# Patient Record
Sex: FEMALE | Race: WHITE | NOT HISPANIC OR LATINO | Employment: FULL TIME | URBAN - METROPOLITAN AREA
[De-identification: names, ages, dates, MRNs, and addresses within clinical notes are randomized per-mention and may not be internally consistent; named-entity substitution may affect disease eponyms.]

---

## 2023-02-22 ENCOUNTER — ANESTHESIA EVENT (OUTPATIENT)
Dept: SURGERY | Facility: HOSPITAL | Age: 63
DRG: 331 | End: 2023-02-22
Payer: COMMERCIAL

## 2023-02-22 RX ORDER — FERROUS SULFATE 325(65) MG
325 TABLET ORAL 2 TIMES DAILY
COMMUNITY

## 2023-02-22 RX ORDER — IBUPROFEN 200 MG
400 TABLET ORAL EVERY 6 HOURS PRN
COMMUNITY

## 2023-02-22 RX ORDER — ASPIRIN 81 MG
200 TABLET, DELAYED RELEASE (ENTERIC COATED) ORAL AT BEDTIME
COMMUNITY

## 2023-02-22 RX ORDER — DOCUSATE SODIUM 100 MG/1
200 CAPSULE, LIQUID FILLED ORAL
COMMUNITY
End: 2023-02-22

## 2023-02-23 ENCOUNTER — ANESTHESIA (OUTPATIENT)
Dept: SURGERY | Facility: HOSPITAL | Age: 63
DRG: 331 | End: 2023-02-23
Payer: COMMERCIAL

## 2023-02-23 ENCOUNTER — HOSPITAL ENCOUNTER (INPATIENT)
Facility: HOSPITAL | Age: 63
LOS: 3 days | Discharge: HOME | DRG: 331 | End: 2023-02-26
Attending: COLON & RECTAL SURGERY | Admitting: COLON & RECTAL SURGERY
Payer: COMMERCIAL

## 2023-02-23 DIAGNOSIS — C18.9 MALIGNANT NEOPLASM OF COLON, UNSPECIFIED PART OF COLON (H): Primary | ICD-10-CM

## 2023-02-23 LAB
ABO/RH(D): NORMAL
ANTIBODY SCREEN: NEGATIVE
GLUCOSE BLDC GLUCOMTR-MCNC: 120 MG/DL (ref 70–99)
HGB BLD-MCNC: 8.4 G/DL (ref 11.7–15.7)
SPECIMEN EXPIRATION DATE: NORMAL

## 2023-02-23 PROCEDURE — 88342 IMHCHEM/IMCYTCHM 1ST ANTB: CPT | Mod: 26 | Performed by: PATHOLOGY

## 2023-02-23 PROCEDURE — 999N000141 HC STATISTIC PRE-PROCEDURE NURSING ASSESSMENT: Performed by: COLON & RECTAL SURGERY

## 2023-02-23 PROCEDURE — 258N000003 HC RX IP 258 OP 636: Performed by: ANESTHESIOLOGY

## 2023-02-23 PROCEDURE — 258N000003 HC RX IP 258 OP 636: Performed by: STUDENT IN AN ORGANIZED HEALTH CARE EDUCATION/TRAINING PROGRAM

## 2023-02-23 PROCEDURE — 250N000009 HC RX 250: Performed by: NURSE ANESTHETIST, CERTIFIED REGISTERED

## 2023-02-23 PROCEDURE — 272N000001 HC OR GENERAL SUPPLY STERILE: Performed by: COLON & RECTAL SURGERY

## 2023-02-23 PROCEDURE — 360N000077 HC SURGERY LEVEL 4, PER MIN: Performed by: COLON & RECTAL SURGERY

## 2023-02-23 PROCEDURE — 250N000025 HC SEVOFLURANE, PER MIN: Performed by: COLON & RECTAL SURGERY

## 2023-02-23 PROCEDURE — 250N000011 HC RX IP 250 OP 636

## 2023-02-23 PROCEDURE — 250N000009 HC RX 250

## 2023-02-23 PROCEDURE — 370N000017 HC ANESTHESIA TECHNICAL FEE, PER MIN: Performed by: COLON & RECTAL SURGERY

## 2023-02-23 PROCEDURE — 250N000011 HC RX IP 250 OP 636: Performed by: STUDENT IN AN ORGANIZED HEALTH CARE EDUCATION/TRAINING PROGRAM

## 2023-02-23 PROCEDURE — 710N000010 HC RECOVERY PHASE 1, LEVEL 2, PER MIN: Performed by: COLON & RECTAL SURGERY

## 2023-02-23 PROCEDURE — C9290 INJ, BUPIVACAINE LIPOSOME: HCPCS | Performed by: ANESTHESIOLOGY

## 2023-02-23 PROCEDURE — 250N000009 HC RX 250: Performed by: COLON & RECTAL SURGERY

## 2023-02-23 PROCEDURE — 86901 BLOOD TYPING SEROLOGIC RH(D): CPT | Performed by: STUDENT IN AN ORGANIZED HEALTH CARE EDUCATION/TRAINING PROGRAM

## 2023-02-23 PROCEDURE — 258N000003 HC RX IP 258 OP 636: Performed by: NURSE ANESTHETIST, CERTIFIED REGISTERED

## 2023-02-23 PROCEDURE — 85018 HEMOGLOBIN: CPT | Performed by: STUDENT IN AN ORGANIZED HEALTH CARE EDUCATION/TRAINING PROGRAM

## 2023-02-23 PROCEDURE — 250N000013 HC RX MED GY IP 250 OP 250 PS 637: Performed by: STUDENT IN AN ORGANIZED HEALTH CARE EDUCATION/TRAINING PROGRAM

## 2023-02-23 PROCEDURE — 88309 TISSUE EXAM BY PATHOLOGIST: CPT | Mod: 26 | Performed by: PATHOLOGY

## 2023-02-23 PROCEDURE — 88342 IMHCHEM/IMCYTCHM 1ST ANTB: CPT | Mod: TC | Performed by: COLON & RECTAL SURGERY

## 2023-02-23 PROCEDURE — 88341 IMHCHEM/IMCYTCHM EA ADD ANTB: CPT | Mod: 26 | Performed by: PATHOLOGY

## 2023-02-23 PROCEDURE — 250N000011 HC RX IP 250 OP 636: Performed by: NURSE ANESTHETIST, CERTIFIED REGISTERED

## 2023-02-23 PROCEDURE — 250N000011 HC RX IP 250 OP 636: Performed by: ANESTHESIOLOGY

## 2023-02-23 PROCEDURE — 250N000009 HC RX 250: Performed by: ANESTHESIOLOGY

## 2023-02-23 PROCEDURE — 36415 COLL VENOUS BLD VENIPUNCTURE: CPT | Performed by: STUDENT IN AN ORGANIZED HEALTH CARE EDUCATION/TRAINING PROGRAM

## 2023-02-23 PROCEDURE — 0DTF4ZZ RESECTION OF RIGHT LARGE INTESTINE, PERCUTANEOUS ENDOSCOPIC APPROACH: ICD-10-PCS | Performed by: COLON & RECTAL SURGERY

## 2023-02-23 PROCEDURE — 120N000001 HC R&B MED SURG/OB

## 2023-02-23 RX ORDER — ENOXAPARIN SODIUM 100 MG/ML
40 INJECTION SUBCUTANEOUS EVERY 24 HOURS
Status: DISCONTINUED | OUTPATIENT
Start: 2023-02-24 | End: 2023-02-26 | Stop reason: HOSPADM

## 2023-02-23 RX ORDER — SODIUM CHLORIDE, SODIUM LACTATE, POTASSIUM CHLORIDE, CALCIUM CHLORIDE 600; 310; 30; 20 MG/100ML; MG/100ML; MG/100ML; MG/100ML
INJECTION, SOLUTION INTRAVENOUS CONTINUOUS
Status: DISCONTINUED | OUTPATIENT
Start: 2023-02-23 | End: 2023-02-23 | Stop reason: HOSPADM

## 2023-02-23 RX ORDER — ACETAMINOPHEN 325 MG/1
975 TABLET ORAL ONCE
Status: COMPLETED | OUTPATIENT
Start: 2023-02-23 | End: 2023-02-23

## 2023-02-23 RX ORDER — BUPIVACAINE HYDROCHLORIDE 2.5 MG/ML
INJECTION, SOLUTION EPIDURAL; INFILTRATION; INTRACAUDAL
Status: COMPLETED | OUTPATIENT
Start: 2023-02-23 | End: 2023-02-23

## 2023-02-23 RX ORDER — FENTANYL CITRATE 50 UG/ML
50 INJECTION, SOLUTION INTRAMUSCULAR; INTRAVENOUS EVERY 5 MIN PRN
Status: DISCONTINUED | OUTPATIENT
Start: 2023-02-23 | End: 2023-02-23 | Stop reason: HOSPADM

## 2023-02-23 RX ORDER — KETAMINE HYDROCHLORIDE 10 MG/ML
INJECTION INTRAMUSCULAR; INTRAVENOUS PRN
Status: DISCONTINUED | OUTPATIENT
Start: 2023-02-23 | End: 2023-02-23

## 2023-02-23 RX ORDER — OXYCODONE HYDROCHLORIDE 5 MG/1
10 TABLET ORAL
Status: CANCELLED | OUTPATIENT
Start: 2023-02-23

## 2023-02-23 RX ORDER — MAGNESIUM HYDROXIDE 1200 MG/15ML
LIQUID ORAL PRN
Status: DISCONTINUED | OUTPATIENT
Start: 2023-02-23 | End: 2023-02-23 | Stop reason: HOSPADM

## 2023-02-23 RX ORDER — FENTANYL CITRATE 50 UG/ML
25 INJECTION, SOLUTION INTRAMUSCULAR; INTRAVENOUS EVERY 5 MIN PRN
Status: DISCONTINUED | OUTPATIENT
Start: 2023-02-23 | End: 2023-02-23 | Stop reason: HOSPADM

## 2023-02-23 RX ORDER — ONDANSETRON 2 MG/ML
INJECTION INTRAMUSCULAR; INTRAVENOUS PRN
Status: DISCONTINUED | OUTPATIENT
Start: 2023-02-23 | End: 2023-02-23

## 2023-02-23 RX ORDER — FENTANYL CITRATE 50 UG/ML
25-100 INJECTION, SOLUTION INTRAMUSCULAR; INTRAVENOUS
Status: DISCONTINUED | OUTPATIENT
Start: 2023-02-23 | End: 2023-02-23 | Stop reason: HOSPADM

## 2023-02-23 RX ORDER — HEPARIN SODIUM 5000 [USP'U]/.5ML
5000 INJECTION, SOLUTION INTRAVENOUS; SUBCUTANEOUS
Status: COMPLETED | OUTPATIENT
Start: 2023-02-23 | End: 2023-02-23

## 2023-02-23 RX ORDER — PROPOFOL 10 MG/ML
INJECTION, EMULSION INTRAVENOUS PRN
Status: DISCONTINUED | OUTPATIENT
Start: 2023-02-23 | End: 2023-02-23

## 2023-02-23 RX ORDER — NALOXONE HYDROCHLORIDE 1 MG/ML
0.4 INJECTION INTRAMUSCULAR; INTRAVENOUS; SUBCUTANEOUS
Status: DISCONTINUED | OUTPATIENT
Start: 2023-02-23 | End: 2023-02-26 | Stop reason: HOSPADM

## 2023-02-23 RX ORDER — ONDANSETRON 2 MG/ML
4 INJECTION INTRAMUSCULAR; INTRAVENOUS EVERY 6 HOURS PRN
Status: DISCONTINUED | OUTPATIENT
Start: 2023-02-23 | End: 2023-02-26 | Stop reason: HOSPADM

## 2023-02-23 RX ORDER — PROPOFOL 10 MG/ML
INJECTION, EMULSION INTRAVENOUS CONTINUOUS PRN
Status: DISCONTINUED | OUTPATIENT
Start: 2023-02-23 | End: 2023-02-23

## 2023-02-23 RX ORDER — SCOLOPAMINE TRANSDERMAL SYSTEM 1 MG/1
1 PATCH, EXTENDED RELEASE TRANSDERMAL ONCE
Status: COMPLETED | OUTPATIENT
Start: 2023-02-23 | End: 2023-02-24

## 2023-02-23 RX ORDER — ONDANSETRON 4 MG/1
4 TABLET, ORALLY DISINTEGRATING ORAL EVERY 6 HOURS PRN
Status: DISCONTINUED | OUTPATIENT
Start: 2023-02-23 | End: 2023-02-26 | Stop reason: HOSPADM

## 2023-02-23 RX ORDER — ONDANSETRON 2 MG/ML
4 INJECTION INTRAMUSCULAR; INTRAVENOUS ONCE
Status: COMPLETED | OUTPATIENT
Start: 2023-02-23 | End: 2023-02-23

## 2023-02-23 RX ORDER — LIDOCAINE HYDROCHLORIDE 10 MG/ML
INJECTION, SOLUTION INFILTRATION; PERINEURAL PRN
Status: DISCONTINUED | OUTPATIENT
Start: 2023-02-23 | End: 2023-02-23

## 2023-02-23 RX ORDER — MAGNESIUM SULFATE 4 G/50ML
4 INJECTION INTRAVENOUS ONCE
Status: COMPLETED | OUTPATIENT
Start: 2023-02-23 | End: 2023-02-23

## 2023-02-23 RX ORDER — NALOXONE HYDROCHLORIDE 1 MG/ML
0.2 INJECTION INTRAMUSCULAR; INTRAVENOUS; SUBCUTANEOUS
Status: DISCONTINUED | OUTPATIENT
Start: 2023-02-23 | End: 2023-02-26 | Stop reason: HOSPADM

## 2023-02-23 RX ORDER — ACETAMINOPHEN 325 MG/1
975 TABLET ORAL EVERY 8 HOURS
Status: COMPLETED | OUTPATIENT
Start: 2023-02-23 | End: 2023-02-26

## 2023-02-23 RX ORDER — LIDOCAINE 40 MG/G
CREAM TOPICAL
Status: DISCONTINUED | OUTPATIENT
Start: 2023-02-23 | End: 2023-02-26 | Stop reason: HOSPADM

## 2023-02-23 RX ORDER — CEFAZOLIN SODIUM/WATER 2 G/20 ML
2 SYRINGE (ML) INTRAVENOUS
Status: COMPLETED | OUTPATIENT
Start: 2023-02-23 | End: 2023-02-23

## 2023-02-23 RX ORDER — ACETAMINOPHEN 325 MG/1
650 TABLET ORAL EVERY 4 HOURS PRN
Status: DISCONTINUED | OUTPATIENT
Start: 2023-02-26 | End: 2023-02-26 | Stop reason: HOSPADM

## 2023-02-23 RX ORDER — SODIUM CHLORIDE, SODIUM LACTATE, POTASSIUM CHLORIDE, CALCIUM CHLORIDE 600; 310; 30; 20 MG/100ML; MG/100ML; MG/100ML; MG/100ML
INJECTION, SOLUTION INTRAVENOUS CONTINUOUS
Status: DISCONTINUED | OUTPATIENT
Start: 2023-02-23 | End: 2023-02-24

## 2023-02-23 RX ORDER — CEFAZOLIN SODIUM/WATER 2 G/20 ML
2 SYRINGE (ML) INTRAVENOUS SEE ADMIN INSTRUCTIONS
Status: DISCONTINUED | OUTPATIENT
Start: 2023-02-23 | End: 2023-02-23 | Stop reason: HOSPADM

## 2023-02-23 RX ORDER — FENTANYL CITRATE 50 UG/ML
INJECTION, SOLUTION INTRAMUSCULAR; INTRAVENOUS PRN
Status: DISCONTINUED | OUTPATIENT
Start: 2023-02-23 | End: 2023-02-23

## 2023-02-23 RX ORDER — ONDANSETRON 4 MG/1
4 TABLET, ORALLY DISINTEGRATING ORAL EVERY 30 MIN PRN
Status: DISCONTINUED | OUTPATIENT
Start: 2023-02-23 | End: 2023-02-23 | Stop reason: HOSPADM

## 2023-02-23 RX ORDER — METRONIDAZOLE 500 MG/100ML
500 INJECTION, SOLUTION INTRAVENOUS
Status: COMPLETED | OUTPATIENT
Start: 2023-02-23 | End: 2023-02-23

## 2023-02-23 RX ORDER — GABAPENTIN 100 MG/1
100 CAPSULE ORAL 3 TIMES DAILY
Status: DISCONTINUED | OUTPATIENT
Start: 2023-02-23 | End: 2023-02-26 | Stop reason: HOSPADM

## 2023-02-23 RX ORDER — LIDOCAINE 40 MG/G
CREAM TOPICAL
Status: DISCONTINUED | OUTPATIENT
Start: 2023-02-23 | End: 2023-02-23 | Stop reason: HOSPADM

## 2023-02-23 RX ORDER — HYDROMORPHONE HYDROCHLORIDE 1 MG/ML
0.2 INJECTION, SOLUTION INTRAMUSCULAR; INTRAVENOUS; SUBCUTANEOUS
Status: DISCONTINUED | OUTPATIENT
Start: 2023-02-23 | End: 2023-02-26 | Stop reason: HOSPADM

## 2023-02-23 RX ORDER — HYDROMORPHONE HYDROCHLORIDE 1 MG/ML
0.4 INJECTION, SOLUTION INTRAMUSCULAR; INTRAVENOUS; SUBCUTANEOUS
Status: DISCONTINUED | OUTPATIENT
Start: 2023-02-23 | End: 2023-02-26 | Stop reason: HOSPADM

## 2023-02-23 RX ORDER — ONDANSETRON 2 MG/ML
4 INJECTION INTRAMUSCULAR; INTRAVENOUS EVERY 30 MIN PRN
Status: DISCONTINUED | OUTPATIENT
Start: 2023-02-23 | End: 2023-02-23 | Stop reason: HOSPADM

## 2023-02-23 RX ORDER — OXYCODONE HYDROCHLORIDE 5 MG/1
5 TABLET ORAL
Status: CANCELLED | OUTPATIENT
Start: 2023-02-23

## 2023-02-23 RX ADMIN — FENTANYL CITRATE 50 MCG: 50 INJECTION, SOLUTION INTRAMUSCULAR; INTRAVENOUS at 15:24

## 2023-02-23 RX ADMIN — PROPOFOL 200 MG: 10 INJECTION, EMULSION INTRAVENOUS at 13:35

## 2023-02-23 RX ADMIN — Medication 2 G: at 13:39

## 2023-02-23 RX ADMIN — FENTANYL CITRATE 50 MCG: 50 INJECTION, SOLUTION INTRAMUSCULAR; INTRAVENOUS at 14:13

## 2023-02-23 RX ADMIN — KETAMINE HYDROCHLORIDE 50 MG: 10 INJECTION, SOLUTION INTRAMUSCULAR; INTRAVENOUS at 13:35

## 2023-02-23 RX ADMIN — BUPIVACAINE HYDROCHLORIDE 20 ML: 2.5 INJECTION, SOLUTION EPIDURAL; INFILTRATION; INTRACAUDAL at 13:49

## 2023-02-23 RX ADMIN — ROCURONIUM BROMIDE 20 MG: 50 INJECTION, SOLUTION INTRAVENOUS at 15:01

## 2023-02-23 RX ADMIN — ONDANSETRON 4 MG: 2 INJECTION INTRAMUSCULAR; INTRAVENOUS at 12:51

## 2023-02-23 RX ADMIN — BUPIVACAINE HYDROCHLORIDE 20 ML: 2.5 INJECTION, SOLUTION EPIDURAL; INFILTRATION; INTRACAUDAL at 01:45

## 2023-02-23 RX ADMIN — LIDOCAINE HYDROCHLORIDE 30 MG: 10 INJECTION, SOLUTION INFILTRATION; PERINEURAL at 13:35

## 2023-02-23 RX ADMIN — FENTANYL CITRATE 50 MCG: 50 INJECTION, SOLUTION INTRAMUSCULAR; INTRAVENOUS at 17:06

## 2023-02-23 RX ADMIN — SODIUM CHLORIDE, POTASSIUM CHLORIDE, SODIUM LACTATE AND CALCIUM CHLORIDE: 600; 310; 30; 20 INJECTION, SOLUTION INTRAVENOUS at 19:17

## 2023-02-23 RX ADMIN — HEPARIN SODIUM 5000 UNITS: 10000 INJECTION, SOLUTION INTRAVENOUS; SUBCUTANEOUS at 12:47

## 2023-02-23 RX ADMIN — SCOPALAMINE 1 PATCH: 1 PATCH, EXTENDED RELEASE TRANSDERMAL at 13:16

## 2023-02-23 RX ADMIN — ONDANSETRON 4 MG: 2 INJECTION INTRAMUSCULAR; INTRAVENOUS at 16:09

## 2023-02-23 RX ADMIN — HYDROMORPHONE HYDROCHLORIDE 0.4 MG: 1 INJECTION, SOLUTION INTRAMUSCULAR; INTRAVENOUS; SUBCUTANEOUS at 17:47

## 2023-02-23 RX ADMIN — ONDANSETRON 4 MG: 2 INJECTION INTRAMUSCULAR; INTRAVENOUS at 17:43

## 2023-02-23 RX ADMIN — MIDAZOLAM 2 MG: 1 INJECTION INTRAMUSCULAR; INTRAVENOUS at 13:23

## 2023-02-23 RX ADMIN — MAGNESIUM SULFATE HEPTAHYDRATE 4 G: 80 INJECTION, SOLUTION INTRAVENOUS at 13:04

## 2023-02-23 RX ADMIN — ACETAMINOPHEN 975 MG: 325 TABLET ORAL at 19:10

## 2023-02-23 RX ADMIN — ACETAMINOPHEN 975 MG: 325 TABLET ORAL at 12:39

## 2023-02-23 RX ADMIN — BUPIVACAINE 10 ML: 13.3 INJECTION, SUSPENSION, LIPOSOMAL INFILTRATION at 13:49

## 2023-02-23 RX ADMIN — SODIUM CHLORIDE, POTASSIUM CHLORIDE, SODIUM LACTATE AND CALCIUM CHLORIDE: 600; 310; 30; 20 INJECTION, SOLUTION INTRAVENOUS at 12:46

## 2023-02-23 RX ADMIN — HYDROMORPHONE HYDROCHLORIDE 0.2 MG: 1 INJECTION, SOLUTION INTRAMUSCULAR; INTRAVENOUS; SUBCUTANEOUS at 20:23

## 2023-02-23 RX ADMIN — SUGAMMADEX 200 MG: 100 INJECTION, SOLUTION INTRAVENOUS at 16:33

## 2023-02-23 RX ADMIN — FENTANYL CITRATE 50 MCG: 50 INJECTION, SOLUTION INTRAMUSCULAR; INTRAVENOUS at 14:11

## 2023-02-23 RX ADMIN — ROCURONIUM BROMIDE 50 MG: 50 INJECTION, SOLUTION INTRAVENOUS at 13:36

## 2023-02-23 RX ADMIN — FENTANYL CITRATE 50 MCG: 50 INJECTION, SOLUTION INTRAMUSCULAR; INTRAVENOUS at 16:12

## 2023-02-23 RX ADMIN — PHENYLEPHRINE HYDROCHLORIDE 100 MCG: 10 INJECTION INTRAVENOUS at 14:56

## 2023-02-23 RX ADMIN — SODIUM CHLORIDE, POTASSIUM CHLORIDE, SODIUM LACTATE AND CALCIUM CHLORIDE: 600; 310; 30; 20 INJECTION, SOLUTION INTRAVENOUS at 14:42

## 2023-02-23 RX ADMIN — PROPOFOL 30 MCG/KG/MIN: 10 INJECTION, EMULSION INTRAVENOUS at 13:45

## 2023-02-23 RX ADMIN — METRONIDAZOLE 500 MG: 500 INJECTION, SOLUTION INTRAVENOUS at 12:56

## 2023-02-23 RX ADMIN — FENTANYL CITRATE 50 MCG: 50 INJECTION, SOLUTION INTRAMUSCULAR; INTRAVENOUS at 17:15

## 2023-02-23 RX ADMIN — GABAPENTIN 100 MG: 100 CAPSULE ORAL at 20:25

## 2023-02-23 ASSESSMENT — ACTIVITIES OF DAILY LIVING (ADL)
ADLS_ACUITY_SCORE: 20

## 2023-02-23 NOTE — PHARMACY-ADMISSION MEDICATION HISTORY
Pharmacy Note - Admission Medication History    Pertinent Provider Information: Patient completed antibiotic prep with neomycin & metronidazole yesterday as instructed.   ______________________________________________________________________    Prior To Admission (PTA) med list completed and updated in EMR.       PTA Med List   Medication Sig Last Dose     acetaminophen-codeine (TYLENOL #3) 300-30 MG tablet Take 1 tablet by mouth every 6 hours as needed for severe pain (7-10) Past Month     docusate sodium (COLACE) 100 MG tablet Take 200 mg by mouth At Bedtime Past Week     ferrous sulfate (FEROSUL) 325 (65 Fe) MG tablet Take 325 mg by mouth 2 times daily On hold Past Week     Glucosamine HCl 1000 MG TABS Take 1,000 mg by mouth daily Past Week     ibuprofen (ADVIL/MOTRIN) 200 MG tablet Take 400 mg by mouth every 6 hours as needed for pain 2/22/2023     MELATONIN PO Take 10 mg by mouth At Bedtime 2/21/2023     Multiple Vitamin (THERAGRAN OR) Take 1 tablet by mouth daily Past Week     VITAMIN D, CHOLECALCIFEROL, PO Take 2,000 Units by mouth daily Past Week       Information source(s): Patient, Clinic records and [facility]    Method of interview communication: in-person    Patient was asked about OTC/herbal products specifically.  PTA med list reflects this.    Based on the pharmacist's assessment, the PTA med list information appears reliable    Medication Affordability:  Not including over the counter (OTC) medications, was there a time in the past 12 months when you did not take your medications as prescribed because of cost?: No    Allergies were reviewed, assessed, and updated with the patient.      Patient does not use any multi-dose medications prior to admission.     Thank you for the opportunity to participate in the care of this patient.      Karson Padron [facility]     2/23/2023     12:24 PM

## 2023-02-23 NOTE — ANESTHESIA PREPROCEDURE EVALUATION
Anesthesia Pre-Procedure Evaluation    Patient: Chelle Cowan   MRN: [id number] : 1960        Procedure : Procedure(s):  LAPAROSCOPIC RIGHT COLECTOMY          Past Medical History:   Diagnosis Date     Anemia      Arthritis      Colon cancer ([facility])      History of blood transfusion      Obese      Thrombosis of leg     with third pregnancy     Varicella       Past Surgical History:   Procedure Laterality Date     ARTHROPLASTY HIP ANTERIOR  10/08/2013    Procedure: ARTHROPLASTY HIP ANTERIOR;  LEFT DIRECT ANTERIOR TOTAL HIP ARTHROPLASTY ;  Surgeon: Navneet Jefferson MD;  Location: [facility] OR     BREAST SURGERY  2011    right breast cyst     BREAST SURGERY      left breast bx     ENT SURGERY      wisdom teeth extraction     WISDOM TOOTH EXTRACTION        No Known Allergies   Social History     Tobacco Use     Smoking status: Never     Smokeless tobacco: Never   Substance Use Topics     Alcohol use: Not Currently      Wt Readings from Last 1 Encounters:   23 74.1 kg (163 lb 6.4 oz)        Anesthesia Evaluation   Pt has had prior anesthetic.     No history of anesthetic complications       ROS/MED HX  ENT/Pulmonary:       Neurologic:       Cardiovascular:       METS/Exercise Tolerance:     Hematologic:     (+) History of blood clots,     Musculoskeletal:       GI/Hepatic:       Renal/Genitourinary:       Endo:       Psychiatric/Substance Use:       Infectious Disease:       Malignancy:       Other:            Physical Exam    Airway        Mallampati: II    Neck ROM: full     Respiratory Devices and Support         Dental       (+) Minor Abnormalities - some fillings, tiny chips      Cardiovascular   cardiovascular exam normal          Pulmonary   pulmonary exam normal                OUTSIDE LABS:  CBC:   Lab Results   Component Value Date    HGB 8.4 (L) 2023    HGB 9.9 (L) 10/10/2013     10/11/2013     10/08/2013     BMP:   Lab Results   Component Value Date    CR 0.45 (L)  10/08/2013     (H) 02/23/2023    GLC 98 10/10/2013     COAGS: No results found for: PTT, INR, FIBR  POC: No results found for: BGM, HCG, HCGS  HEPATIC: No results found for: ALBUMIN, PROTTOTAL, ALT, AST, GGT, ALKPHOS, BILITOTAL, BILIDIRECT, MINERVA  OTHER: No results found for: PH, LACT, A1C, NNAMDI, PHOS, MAG, LIPASE, AMYLASE, TSH, T4, T3, CRP, SED    Anesthesia Plan    ASA Status:  2      Anesthesia Type: General.     - Airway: ETT              Consents    Anesthesia Plan(s) and associated risks, benefits, and realistic alternatives discussed. Questions answered and patient/representative(s) expressed understanding.     - Discussed: Risks, Benefits and Alternatives for the PROCEDURE were discussed     - Discussed with:  Patient      - Extended Intubation/Ventilatory Support Discussed: No.      - Patient is DNR/DNI Status: No    Use of blood products discussed: No .     Postoperative Care    Pain management: Multi-modal analgesia.   PONV prophylaxis: Ondansetron (or other 5HT-3), Dexamethasone or Solumedrol     Comments:                Naa Gaspar MD

## 2023-02-23 NOTE — ANESTHESIA CARE TRANSFER NOTE
Patient: Chelle Cowan    Procedure: Procedure(s):  LAPAROSCOPIC RIGHT COLECTOMY       Diagnosis: Screen for colon cancer [Z12.11]  Diagnosis Additional Information: No value filed.    Anesthesia Type:   General     Note:    Oropharynx: oropharynx clear of all foreign objects  Level of Consciousness: drowsy  Oxygen Supplementation: face mask  Level of Supplemental Oxygen (L/min / FiO2): 6  Independent Airway: airway patency satisfactory and stable  Dentition: dentition unchanged  Vital Signs Stable: post-procedure vital signs reviewed and stable  Report to RN Given: handoff report given  Patient transferred to: PACU  Comments: Patient transferred to PACU by CRNA. Report given to PACU RN, all questions answered. Patient hemodynamically stable. CRNA ensured PACU RN was comfortable taking over care.  Handoff Report: Identifed the Patient, Identified the Reponsible Provider, Reviewed the pertinent medical history, Discussed the surgical course, Reviewed Intra-OP anesthesia mangement and issues during anesthesia, Set expectations for post-procedure period and Allowed opportunity for questions and acknowledgement of understanding      Vitals:  Vitals Value Taken Time   /78 02/23/23 1649   Temp 36.3  C (97.4  F) 02/23/23 1649   Pulse 95 02/23/23 1649   Resp 12 02/23/23 1649   SpO2 100 % 02/23/23 1649       Electronically Signed By: ASHLEY Palmer CRNA  February 23, 2023  4:51 PM

## 2023-02-23 NOTE — ANESTHESIA PROCEDURE NOTES
"TAP Procedure Note    Pre-Procedure   Staff -        Anesthesiologist:  Naa Gaspar MD       Performed By: anesthesiologist       Location: OR       Procedure Start/Stop Times: 2/23/2023 1:49 PM and 2/23/2023 1:52 PM       Pre-Anesthestic Checklist: patient identified, IV checked, site marked, risks and benefits discussed, informed consent, monitors and equipment checked, pre-op evaluation, at physician/surgeon's request and post-op pain management  Timeout:       Correct Patient: Yes        Correct Procedure: Yes        Correct Site: Yes        Correct Position: Yes        Correct Laterality: Yes        Site Marked: Yes  Procedure Documentation  Procedure: TAP       Laterality: right       Patient Position: supine       Skin prep: Chloraprep       Ultrasound guided       1. Ultrasound was used to identify targeted nerve, plexus, vascular marker, or fascial plane and place a needle adjacent to it in real-time.       2. Ultrasound was used to visualize the spread of anesthetic in close proximity to the above referenced structure.       3. A permanent image is entered into the patient's record.    Assessment/Narrative         The placement was negative for: blood aspirated, painful injection and site bleeding       Paresthesias: No.       Bolus given via needle..        Secured via.        Insertion/Infusion Method: Single Shot       Complications: none    Medication(s) Administered   Bupivacaine 0.25% PF (Infiltration) - Infiltration   20 mL - 2/23/2023 1:49:00 PM  Bupivacaine liposome (Exparel) 1.3% LA inj susp (Infiltration) - Infiltration   10 mL - 2/23/2023 1:49:00 PM  Medication Administration Time: 2/23/2023 1:49 PM      FOR [facility] [place] ONLY:   Pain Team Contact information: please page the Pain Team Via [employer]. Search \"Pain\". During daytime hours, please page the attending first. At night please page the resident first.    "

## 2023-02-23 NOTE — ANESTHESIA POSTPROCEDURE EVALUATION
Patient: Chelle Cowan    Procedure: Procedure(s):  LAPAROSCOPIC RIGHT COLECTOMY       Anesthesia Type:  General    Note:  Disposition: Inpatient   Postop Pain Control: Uneventful            Sign Out: Well controlled pain   PONV: Yes            Symptoms: Nausea only            Sign Out: mild nausea.   Neuro/Psych: Uneventful            Sign Out: Acceptable/Baseline neuro status   Airway/Respiratory: Uneventful            Sign Out: Acceptable/Baseline resp. status   CV/Hemodynamics: Uneventful            Sign Out: Acceptable CV status; No obvious hypovolemia; No obvious fluid overload   Other NRE: NONE   DID A NON-ROUTINE EVENT OCCUR? No           Last vitals:  Vitals Value Taken Time   /72 02/23/23 1745   Temp 36.3  C (97.4  F) 02/23/23 1649   Pulse 84 02/23/23 1746   Resp 15 02/23/23 1746   SpO2 94 % 02/23/23 1746   Vitals shown include unvalidated device data.    Electronically Signed By: Skye Ron MD  February 23, 2023  5:47 PM

## 2023-02-23 NOTE — OP NOTE
COLORECTAL SURGERY OPERATIVE NOTE    Date of Service: 2/23/2023     Pre-Operative Diagnosis:   1. Colon cancer  2. Anemia    Post-Operative Diagnosis: Same    Procedure: Laparoscopic right colectomy    Surgeon: Zachery Mcdermott MD    Assistant: Gabby Giles PA-C    Anesthesia: General     Estimated Blood Loss: 10 mL    Intent: Curative    Vascular Pedicle Ligated: Ileocolic artery and right colic artery    Bowel Prep: Mechanical and antibioic    Indication: The patient is a 62 year old female presenting with anemia. A pre-operative work-up included complete staging.  We discussed the the procedure of laparoscopic right colectomy, possible open. Risks outlined include bleeding (surgical site or anastomotic), infection, and damage to surrounding structures including the small bowel and ureter.  We specifically discussed the risk of anastomotic leak (less than 5%) which would need to be treated with either antibiotics, a percutaneous drain, or in the most severe cases, return to the operating room for recreation of the anastomosis or a temporary ostomy.  We also discussed the general medical risks including heart attack, stroke, death.  Please see my office notes for full details of this discussion.    Findings: No abnormalities/evidence of metastatic disease of the peritoneal surfaces, liver, stomach, or pelvis. Large tumor in ascending colon.     Specimen: Right colon    Operative Details: After informed consent was obtained, the patient was brought to the operating room and placed in supine position on the operating room table. Sequential compression devices were placed on bilateral lower extremities prior to induction, the patient was given 5000 units of heparin and appropriate pre-operative antibiotics per SCIP measures.  They were placed under general anesthesia was without difficulty.  Both arms were tucked. A Pigazzi pink pad was used on the operating room table.  The patient underwent tap blocks by  anesthesia and was then moved into the modified lithotomy position.  All bony prominences were appropriately padded. A maldonado catheter was placed by the operating room nurse without difficulty.  The patient's abdomen was sterilely prepped and draped in usual fashion. A timeout verifying all operating room personnel, the correct procedure, the correct patient, and the correct site was performed.    We began with a Edge cutdown technique in the supraumbilical position.  A 12 mm Edge port was placed.  The peritoneal cavity was inspected.  There were no abnormalities of the peritoneum, the liver, stomach, or the pelvis, or the omentum.  Three 5 mm trochars were placed, one in the left midabdomen, 1 in the left lower quadrant, and one in the suprapubic position.  The patient was then moved into Trendelenburg position with the left side down.  The transverse colon was elevated and the small bowel was swept into the pelvis. The tattoo could be seen in the ascending colon and the tumor could be seen obviously proximal to this.    The ileocolic pedicle was grasped and elevated.  The plane between the retroperitoneum and the mesentery was clearly identified. The peritoneum was incised using electrocautery.  The plane was entered between the retroperitoneum and the ileocolic artery.  The retroperitoneum was swept down until the duodenum was fully visualized.  This was also swept down out of harm's way.  Once we had clearly  the duodenum, we turned our attention towards high ligation of the ileocolic trunk.  A window was created around the artery.  The artery was divided using the LigaSure device twice on the patient's side, twice on the specimen side, and twice in between.  I then continued up the mesentery until I encountered the right colic artery.  A window was created around this, and it was divided in the same fashion with the artery divided, we continued our medial to lateral dissection coming underneath the  transverse colon in the cephalad position, continuing this around to the hepatic flexure, and extending our dissection out to the left lateral sidewall.    We then turned our attention to the lateral approach.  The peritoneum between the mesentery of the distal ileum and retroperitoneum was .  We joined our previously dissected plane and continued our separation of the lateral attachments up to the point of the hepatic flexure.  The patient was then turned into reverse Trendelenburg position and the plane between the greater omentum and transverse colon was entered.  We continued this plane down into our previously dissected area from below.  We continued this out laterally towards the flexure until we encountered our previous dissection.  At this point, the colon was completely mobilized.  The appendix was then grasped with a locking grasper.  The remaining trochars were removed under direct visualization.    A 2 cm extension to the incision was made in the supraumbilical position and the fascia was opened accordingly.  The peritoneal cavity was desufflated.  A medium Alejandro wound protector was placed.  I attempted to exteriorize the colon.  The colon itself came easily, however it was clearly stuck on the tumor itself.  This tumor was quite large and I did not want to perforated.  Therefore, I extended the incision up approximately another 3 cm which allowed the tumor to come out.  With the specimen delivered, I made a window in the mesentery of the terminal ileum and used the LigaSure to divide this down to the division of the pedicle.  I selected an appropriate location for transection in the proximal transverse colon and created another mesocolic window here.  I divided this using the LigaSure down to the prior cut edge of the mesentery as well.  At this point, the bowel was lined up for the anastomosis.  Sterile blue towels were placed down.  A colotomy and enterotomy was made on each side of the  bowel respectively.  1 limb of the 75 mm blue load VIELKA stapler was placed into each side of the anastomosis.  This was rotated in an antimesenteric position and fired.  The stapler was removed and passed off of the field.  The mucosa was inspected and was hemostatic.  Allis clamps were placed across this common enterotomy and it was closed using a firing of a TA 90 blue load stapler.  The specimen was passed off of the field.  Outer gloves were then changed and instruments used during the anastomosis were passed off the field along with the blue towels.  The few 3-0 Vicryl sutures had to be placed in the staple line for hemostasis.  The staple line was then oversewn in its entirety using 3-0 Vicryl Lembert sutures.  A 3-0 Vicryl crotch stitch was placed as well.  The anastomosis sat nicely and did not have any apparent defects.  It was returned to the peritoneal cavity.  The peritoneal cavity was then irrigated until the effluent ran clear.  The wound protector was removed.    We now turned our attention to the clean closure portion of the procedure were gowns, gloves, and instruments were changed. The fascia of the extraction port was closed using running #1 PDS suture.  The incision sites were copiously irrigated and the skin was closed using running 4-0 Monocryl and Dermabond.  This completed our planned procedure.    All sponge and instrument counts were correct x2.  The patient was awakened from general anesthesia in the operating room from anesthesia.    Complications: None    Disposition: Stable, extubated, to PACU    Zachery Mcdermott MD, KEVIN  [facility]  Office: [phone]  2/23/2023

## 2023-02-23 NOTE — INTERVAL H&P NOTE
"I have reviewed the surgical (or preoperative) H&P that is linked to this encounter, and examined the patient. There are no significant changes    Clinical Conditions Present on Arrival:  Clinically Significant Risk Factors Present on Admission                    # Overweight: Estimated body mass index is 29.89 kg/m  as calculated from the following:    Height as of this encounter: 1.575 m (5' 2\").    Weight as of this encounter: 74.1 kg (163 lb 6.4 oz).     # Anemia  # Colon cancer    62F w right colon cancer plan for lap right colectomy    Zachery Mcdermott MD, KEVIN  [facility]  Office: [phone]  2/23/2023 1:12 PM       "

## 2023-02-23 NOTE — ANESTHESIA PROCEDURE NOTES
Airway       Patient location during procedure: OR       Procedure Start/Stop Times: 2/23/2023 1:38 PM  Staff -        CRNA: Cecilia Eagle APRN CRNA       Performed By: CRNAIndications and Patient Condition       Indications for airway management: jerad-procedural       Induction type:intravenous       Mask difficulty assessment: 1 - vent by mask    Final Airway Details       Final airway type: endotracheal airway       Successful airway: ETT - single  Endotracheal Airway Details        ETT size (mm): 7.5       Cuffed: yes       Successful intubation technique: direct laryngoscopy       DL Blade Type: Acosta 2       Grade View of Cords: 1       Adjucts: stylet       Position: Right       Measured from: lips       Secured at (cm): 22       Bite block used: None    Post intubation assessment        Placement verified by: capnometry, equal breath sounds and chest rise        Number of attempts at approach: 1       Number of other approaches attempted: 0       Secured with: silk tape       Ease of procedure: easy       Dentition: Unchanged    Medication(s) Administered   Medication Administration Time: 2/23/2023 1:38 PM

## 2023-02-23 NOTE — ANESTHESIA PROCEDURE NOTES
"TAP Procedure Note    Pre-Procedure   Staff -        Anesthesiologist:  Naa Gaspar MD       Performed By: anesthesiologist       Location: post-op       Procedure Start/Stop Times: 2/23/2023 1:45 AM and 2/23/2023 1:48 PM       Pre-Anesthestic Checklist: patient identified, IV checked, site marked, risks and benefits discussed, informed consent, monitors and equipment checked, pre-op evaluation, at physician/surgeon's request and post-op pain management  Timeout:       Correct Patient: Yes        Correct Procedure: Yes        Correct Site: Yes        Correct Position: Yes        Correct Laterality: Yes        Site Marked: Yes  Procedure Documentation  Procedure: TAP       Laterality: left       Patient Position: supine       Skin prep: Chloraprep       Ultrasound guided       1. Ultrasound was used to identify targeted nerve, plexus, vascular marker, or fascial plane and place a needle adjacent to it in real-time.       2. Ultrasound was used to visualize the spread of anesthetic in close proximity to the above referenced structure.       3. A permanent image is entered into the patient's record.    Assessment/Narrative         The placement was negative for: blood aspirated, painful injection and site bleeding       Paresthesias: No.       Bolus given via needle..        Secured via.        Insertion/Infusion Method: Single Shot       Complications: none    Medication(s) Administered   Bupivacaine 0.25% PF (Infiltration) - Infiltration   20 mL - 2/23/2023 1:45:00 AM  Bupivacaine liposome (Exparel) 1.3% LA inj susp (Infiltration) - Infiltration   10 mL - 2/23/2023 1:45:00 AM  Medication Administration Time: 2/23/2023 1:45 AM      FOR [facility] ([facility]) ONLY:   Pain Team Contact information: please page the Pain Team Via [employer]. Search \"Pain\". During daytime hours, please page the attending first. At night please page the resident first.    "

## 2023-02-23 NOTE — BRIEF OP NOTE
[facility]    Brief Operative Note    Pre-operative diagnosis: colon cancer [Z12.11]  Post-operative diagnosis Same as pre-operative diagnosis    Procedure: Procedure(s):  LAPAROSCOPIC RIGHT COLECTOMY  Surgeon: Surgeon(s) and Role:     * Zachery Mcdermott MD - Primary     * Kenna Chadwick PA-C - Assisting  Anesthesia: General with Block   Estimated Blood Loss: 10 mL    Drains: None  Specimens:   ID Type Source Tests Collected by Time Destination   1 : Right colon, open and return Tissue Large Intestine, Colon, Ascending SURGICAL PATHOLOGY EXAM Zachery Mcdermott MD 2/23/2023  3:40 PM      Findings:   Tumor in the ascending colon, tattoo in the ascending colon.  Complications: None.  Implants: * No implants in log *          POSTOPERATIVE / POSTPROCEDURE NOTE - IMMEDIATE :    Surgeon(s)/Proceduralist(s) and Assistants (if any):  Surgeon(s):  Kenna Chadwick PA-C Damle, Aneel, MD  Circulator: Donna Jo RN; Anahi Jacobsen RN  Scrub Person: Lorraine Marks; Schoenecker, Carla J, RN; Skye Hercules    Procedure(s):  Laparoscopic right colectomy    Procedure(s) findings: Tumor in the ascending colon, tattoo in the ascending colon.    Specimen(s) removed: Yes    (EBL) Estimated blood loss (ml): 10 mL    Postoperative/Postprocedure Diagnosis: Ascending colon cancer      KENNA CHADWICK PA-C      ADDENDUM:    PATIENT DATA  Indicate Y or N:  Home O2 No  Hemodialysis No  Transplant patient No  Cirrhosis No  Steroids in last 30 days No  Immunomodulators in last 30 days No  Anticoagulation at time of surgery No   List medication   Prior abdominal surgery Yes  Pelvic irradiation No    Albumin within 30 days if known   Hgb within 30 days if known 8.4  Cr within 30 days if known   Body mass index is 29.89 kg/m .    OR DATA  Emergent No   <24 hours No   <1 week No  Bowel Prep (Y or N) Yes  Antibiotics (Y or N) Yes  DVT prophylaxis    Heparin Yes   SCD Yes   None No  Drain  No  ASA (1,2,3,4,5 if unknown) 2  OR time (min) 145  Stents No  Transfuse >/= 2U No  Anastomosis   Stapled Yes   Handsewn No  Leak Test (pos, neg, not done) Not done    FOR CANCER ALSO COMPLETE:  Preoperative treatment (Y or N)   Chemo No   Radiation No   Diversion No  Preoperative Stage   U/S No   MRI No   Clinical Yes   Unknown Yes   T stage (1,2,3,4,5 if unknown) 5   N stage (0,1,2,3 if unknown) 3   M stage (0,1) 0  CEA 1.6  Metastatic disease at time of operation (Y or N) No

## 2023-02-24 LAB
ANION GAP SERPL CALCULATED.3IONS-SCNC: 8 MMOL/L (ref 7–15)
BUN SERPL-MCNC: 4.6 MG/DL (ref 8–23)
CALCIUM SERPL-MCNC: 8.6 MG/DL (ref 8.8–10.2)
CHLORIDE SERPL-SCNC: 105 MMOL/L (ref 98–107)
CREAT SERPL-MCNC: 0.6 MG/DL (ref 0.51–0.95)
DEPRECATED HCO3 PLAS-SCNC: 27 MMOL/L (ref 22–29)
ERYTHROCYTE [DISTWIDTH] IN BLOOD BY AUTOMATED COUNT: 22.5 % (ref 10–15)
GFR SERPL CREATININE-BSD FRML MDRD: >90 ML/MIN/1.73M2
GLUCOSE SERPL-MCNC: 120 MG/DL (ref 70–99)
HCT VFR BLD AUTO: 26.3 % (ref 35–47)
HGB BLD-MCNC: 7.4 G/DL (ref 11.7–15.7)
MAGNESIUM SERPL-MCNC: 2.2 MG/DL (ref 1.7–2.3)
MCH RBC QN AUTO: 24 PG (ref 26.5–33)
MCHC RBC AUTO-ENTMCNC: 28.1 G/DL (ref 31.5–36.5)
MCV RBC AUTO: 85 FL (ref 78–100)
PLATELET # BLD AUTO: 396 10E3/UL (ref 150–450)
POTASSIUM SERPL-SCNC: 3.3 MMOL/L (ref 3.4–5.3)
POTASSIUM SERPL-SCNC: 3.9 MMOL/L (ref 3.4–5.3)
RBC # BLD AUTO: 3.08 10E6/UL (ref 3.8–5.2)
SODIUM SERPL-SCNC: 140 MMOL/L (ref 136–145)
WBC # BLD AUTO: 7.7 10E3/UL (ref 4–11)

## 2023-02-24 PROCEDURE — 250N000011 HC RX IP 250 OP 636: Performed by: STUDENT IN AN ORGANIZED HEALTH CARE EDUCATION/TRAINING PROGRAM

## 2023-02-24 PROCEDURE — 80048 BASIC METABOLIC PNL TOTAL CA: CPT | Performed by: STUDENT IN AN ORGANIZED HEALTH CARE EDUCATION/TRAINING PROGRAM

## 2023-02-24 PROCEDURE — 36415 COLL VENOUS BLD VENIPUNCTURE: CPT | Performed by: COLON & RECTAL SURGERY

## 2023-02-24 PROCEDURE — 120N000001 HC R&B MED SURG/OB

## 2023-02-24 PROCEDURE — 258N000003 HC RX IP 258 OP 636: Performed by: STUDENT IN AN ORGANIZED HEALTH CARE EDUCATION/TRAINING PROGRAM

## 2023-02-24 PROCEDURE — 36415 COLL VENOUS BLD VENIPUNCTURE: CPT | Performed by: STUDENT IN AN ORGANIZED HEALTH CARE EDUCATION/TRAINING PROGRAM

## 2023-02-24 PROCEDURE — 84132 ASSAY OF SERUM POTASSIUM: CPT | Performed by: COLON & RECTAL SURGERY

## 2023-02-24 PROCEDURE — 83735 ASSAY OF MAGNESIUM: CPT | Performed by: COLON & RECTAL SURGERY

## 2023-02-24 PROCEDURE — 85027 COMPLETE CBC AUTOMATED: CPT | Performed by: STUDENT IN AN ORGANIZED HEALTH CARE EDUCATION/TRAINING PROGRAM

## 2023-02-24 PROCEDURE — 250N000013 HC RX MED GY IP 250 OP 250 PS 637: Performed by: COLON & RECTAL SURGERY

## 2023-02-24 PROCEDURE — 250N000013 HC RX MED GY IP 250 OP 250 PS 637: Performed by: STUDENT IN AN ORGANIZED HEALTH CARE EDUCATION/TRAINING PROGRAM

## 2023-02-24 RX ORDER — POTASSIUM CHLORIDE 1500 MG/1
40 TABLET, EXTENDED RELEASE ORAL ONCE
Status: COMPLETED | OUTPATIENT
Start: 2023-02-24 | End: 2023-02-24

## 2023-02-24 RX ADMIN — GABAPENTIN 100 MG: 100 CAPSULE ORAL at 20:09

## 2023-02-24 RX ADMIN — POTASSIUM CHLORIDE 40 MEQ: 1500 TABLET, EXTENDED RELEASE ORAL at 07:51

## 2023-02-24 RX ADMIN — GABAPENTIN 100 MG: 100 CAPSULE ORAL at 07:50

## 2023-02-24 RX ADMIN — ACETAMINOPHEN 975 MG: 325 TABLET ORAL at 11:11

## 2023-02-24 RX ADMIN — GABAPENTIN 100 MG: 100 CAPSULE ORAL at 14:30

## 2023-02-24 RX ADMIN — HYDROMORPHONE HYDROCHLORIDE 0.4 MG: 1 INJECTION, SOLUTION INTRAMUSCULAR; INTRAVENOUS; SUBCUTANEOUS at 20:08

## 2023-02-24 RX ADMIN — HYDROMORPHONE HYDROCHLORIDE 0.4 MG: 1 INJECTION, SOLUTION INTRAMUSCULAR; INTRAVENOUS; SUBCUTANEOUS at 11:10

## 2023-02-24 RX ADMIN — HYDROMORPHONE HYDROCHLORIDE 0.4 MG: 1 INJECTION, SOLUTION INTRAMUSCULAR; INTRAVENOUS; SUBCUTANEOUS at 03:43

## 2023-02-24 RX ADMIN — SODIUM CHLORIDE, POTASSIUM CHLORIDE, SODIUM LACTATE AND CALCIUM CHLORIDE: 600; 310; 30; 20 INJECTION, SOLUTION INTRAVENOUS at 07:57

## 2023-02-24 RX ADMIN — ACETAMINOPHEN 975 MG: 325 TABLET ORAL at 18:49

## 2023-02-24 RX ADMIN — ACETAMINOPHEN 975 MG: 325 TABLET ORAL at 03:43

## 2023-02-24 RX ADMIN — HYDROMORPHONE HYDROCHLORIDE 0.4 MG: 1 INJECTION, SOLUTION INTRAMUSCULAR; INTRAVENOUS; SUBCUTANEOUS at 07:57

## 2023-02-24 RX ADMIN — HYDROMORPHONE HYDROCHLORIDE 0.4 MG: 1 INJECTION, SOLUTION INTRAMUSCULAR; INTRAVENOUS; SUBCUTANEOUS at 00:37

## 2023-02-24 RX ADMIN — HYDROMORPHONE HYDROCHLORIDE 0.4 MG: 1 INJECTION, SOLUTION INTRAMUSCULAR; INTRAVENOUS; SUBCUTANEOUS at 15:43

## 2023-02-24 RX ADMIN — Medication 10 MG: at 20:09

## 2023-02-24 RX ADMIN — ENOXAPARIN SODIUM 40 MG: 40 INJECTION SUBCUTANEOUS at 14:30

## 2023-02-24 ASSESSMENT — ACTIVITIES OF DAILY LIVING (ADL)
ADLS_ACUITY_SCORE: 20
DEPENDENT_IADLS:: INDEPENDENT
ADLS_ACUITY_SCORE: 20

## 2023-02-24 NOTE — PLAN OF CARE
Problem: Pain Acute  Goal: Optimal Pain Control and Function  Outcome: Progressing     Problem: Infection  Goal: Absence of Infection Signs and Symptoms  Outcome: Progressing     Problem: Surgery Nonspecified  Goal: Absence of Bleeding  Outcome: Progressing   Goal Outcome Evaluation:    VSS on RA. Abdominal incision clean,dry, intact. Pain managed with scheduled tylenol and iv dilaudid. Tolerating clears. Amaya in place with good urine output.

## 2023-02-24 NOTE — PROGRESS NOTES
"SPIRITUAL HEALTH SERVICES NOTE  [facility]; P2    SPIRITUAL ASSESSMENT  Adjusting to a new diagnosis  Grateful that surgery went smoothly  Denies any concerns at this time  Reports good support from boyfriend and family  Voodoo; welcomes prayer    FULL SPIRITUAL CARE NOTE:   Saw Moon due to admission screening response/patient request upon admission. She shares that her colectomy went well yesterday. She is awaiting pathology results, but it is encouraged by hearing that her margins were clean and knowing that her CT scan in January did not show any other lesions. Moon says that she had COVID in October and originally thought her fatigue was due to that. She describes being SOB and deciding in January that she needed to do something about it. She is grateful that she does not need an ostomy at this time.     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 She is inspired by her patients and has enjoyed her job. She notes that she may need to make some changes in the near future so that she can focus on her own health. She identifies her boyfriend Nabil, her sister and her 5 adult children as sources of support. She is growing in her ability to ask for help. Moon denies any concerns about discharge. She says that she usually is a \"go with the flow\" kind of person and says that she doesn't get too worked up about things. I affirmed this practice and how valuable it can be in the months ahead. She is Voodoo and welcomes prayer. Brought her lavender oil aromatherapy and placed a request for a hand/foot massage through [facility].     Time Spent with Patient/Family: 20 minutes    PLAN OF CARE: Will remain available for further support as patient/family needs/desires.    Maribell Acosta M.Div.      Office: [phone] (for non-urgent requests)  Please Vocera or page through [facility] for time-sensitive requests    "

## 2023-02-24 NOTE — PLAN OF CARE
Problem: Pain Acute  Goal: Optimal Pain Control and Function  Intervention: Develop Pain Management Plan  Recent Flowsheet Documentation  Taken 2/23/2023 2023 by Cari Ramirez, RN  Pain Management Interventions:    medication (see MAR)    cold applied  Intervention: Prevent or Manage Pain  Recent Flowsheet Documentation  Taken 2/23/2023 1930 by Cari Ramirez, RN  Medication Review/Management: medications reviewed   Goal Outcome Evaluation:  1900.... Pt arrived on the unit from PACU after a Laparoscopic Right Colectomy, 3 lap sites and a small incision, areas are CDI. Pt has a maldonado cath is patent, output has been charted.

## 2023-02-24 NOTE — PROGRESS NOTES
Colon and Rectal Surgery  Daily Progress Note    Subjective  Patient reports doing well. Pain is controlled. No nausea. Has been ambulating.    Objective  Intake/Output last 24 hrs:    Intake/Output Summary (Last 24 hours) at 2/24/2023 0925  Last data filed at 2/24/2023 0351  Gross per 24 hour   Intake 2539 ml   Output 3715 ml   Net -1176 ml     Temp:  [97.4  F (36.3  C)-98.9  F (37.2  C)] 98.2  F (36.8  C)  Pulse:  [72-98] 85  Resp:  [12-18] 18  BP: (115-169)/(54-80) 144/68  SpO2:  [95 %-100 %] 96 %  Excellent UOP    Physical Exam:  General: awake, alert, in no acute distress  Respiratory: non-labored breathing  Abdomen: soft, appropriately tender, non-distended              Incisions: clean, dry and intact  Musculoskeletal: moves all four extremities equally; no calf edema or tenderness  Psychological: alert and oriented, answers questions appropriately    Pertinent Labs  Lab Results: personally reviewed.  Lab Results   Component Value Date     02/24/2023    CO2 27 02/24/2023    BUN 4.6 02/24/2023     Lab Results   Component Value Date    WBC 7.7 02/24/2023    HGB 7.4 02/24/2023    HGB 8.4 02/23/2023    HGB 9.9 10/10/2013    HGB 10.4 10/09/2013    HCT 26.3 02/24/2023    MCV 85 02/24/2023     02/24/2023     10/11/2013     10/08/2013       Assessment/Plan: This is a 62 year old female POD #1 s/p lap right colectomy for colon cancer.  - Amaya out  - Discontinue continuous pulse ox  - Full liquid diet  - OOB  - Stop IV fluids  - Labs tomorrow  - VTE chemoprophylaxis  - Awaiting return of bowel function. Will transition to oral pain medication only once tolerating low fiber diet. No oral potassium repletion until taking low fiber diet. She may discharge over the weekend. She will go on extended VTE prophylaxis.     Zachery Mcdermott MD, KEVIN  [facility]  Office: [phone]  2/24/2023 9:25 AM

## 2023-02-24 NOTE — PLAN OF CARE
Goal Outcome Evaluation:         Problem: Surgery Nonspecified  Goal: Optimal Pain Control and Function  Outcome: Progressing  Intervention: Prevent or Manage Pain  Recent Flowsheet Documentation  Taken 2/24/2023 1110 by Mary Ann Roper RN  Pain Management Interventions:    medication (see MAR)    repositioned    rest  Taken 2/24/2023 0757 by Mary Ann Roper RN  Pain Management Interventions: medication (see MAR)     Problem: Surgery Nonspecified  Goal: Effective Urinary Elimination  Outcome: Progressing     Patient alert and oriented x4, up walking with stand by assistance-did sit up in chair for meals and walked in the hallway. Incisions to abdomen clean, dry and intact-open to air. Bowel sounds active, had small bowel movement. Patient tolerating clear liquids. Pt getting scheduled tylenol and IV dilaudid for pain control which is effective in keeping the pain tolerable. Amaya removed at 11 am-voiding without issues. Replaced K per protocol. Will continue to monitor.     Mary Ann Roper RN 2/24/2023 3:09 PM

## 2023-02-24 NOTE — CONSULTS
Care Management Initial Consult    General Information  Assessment completed with: Patient,    Type of CM/SW Visit: Offer D/C Planning    Primary Care Provider verified and updated as needed:     Readmission within the last 30 days: no previous admission in last 30 days      Reason for Consult: discharge planning  Advance Care Planning:            Communication Assessment  Patient's communication style: spoken language (English or Bilingual)    Hearing Difficulty or Deaf: no   Wear Glasses or Blind: no    Cognitive  Cognitive/Neuro/Behavioral: WDL     Arousal Level: arouses to voice                Living Environment:   People in home: significant other  sig tyrel Stinson  Current living Arrangements: house      Able to return to prior arrangements: yes       Family/Social Support:  Care provided by: self, spouse/significant other  Provides care for: no one  Marital Status: Single  Significant Other, Children          Description of Support System: Supportive, Involved         Current Resources:   Patient receiving home care services: No     Community Resources:    Equipment currently used at home: none  Supplies currently used at home:      Employment/Financial:  Employment Status:          Financial Concerns:             Lifestyle & Psychosocial Needs:  Social Determinants of Health     Tobacco Use: Low Risk      Smoking Tobacco Use: Never     Smokeless Tobacco Use: Never     Passive Exposure: Not on file   Alcohol Use: Not on file   Financial Resource Strain: Not on file   Food Insecurity: Not on file   Transportation Needs: Not on file   Physical Activity: Not on file   Stress: Not on file   Social Connections: Not on file   Intimate Partner Violence: Not on file   Depression: Not on file   Housing Stability: Not on file       Functional Status:  Prior to admission patient needed assistance:   Dependent ADLs:: Independent  Dependent IADLs:: Independent  Assesssment of Functional Status: Not at baseline with ADL  Functioning    Mental Health Status:          Chemical Dependency Status:                Values/Beliefs:  Spiritual, Cultural Beliefs, Jain Practices, Values that affect care:                 Additional Information:  Assessed.  Pt POD1 for colorectal surgery.  Pt denies needs at this time and plans to return home in care of her significant other.  CM will continue to follow as needed.     Florencia Mathews RN

## 2023-02-24 NOTE — PROGRESS NOTES
Colon and Rectal Surgery  Daily Progress Note    Subjective  Patient reports having some abdominal soreness, controlled with medications. Has tolerated some clears without nausea or vomiting. Has not yet ambulated, but up in the chair this morning. No flatus or stool yet, but feeling rumbles. Maldonado with 3.7L out. Afebrile and VSS.    Hgb 7.4 from 8.4  WBC 7.7  Cr 0.60    Objective  Intake/Output last 24 hrs:    Intake/Output Summary (Last 24 hours) at 2/24/2023 0922  Last data filed at 2/24/2023 0351  Gross per 24 hour   Intake 2539 ml   Output 3715 ml   Net -1176 ml     Temp:  [97.4  F (36.3  C)-98.9  F (37.2  C)] 98.2  F (36.8  C)  Pulse:  [72-98] 85  Resp:  [12-18] 18  BP: (115-169)/(54-80) 144/68  SpO2:  [95 %-100 %] 96 %    Physical Exam:  General: awake, alert, sitting in recliner, in no acute distress  Head: normocephalic, atraumatic  Respiratory: non-labored breathing  Abdomen: soft, appropriately tender, non-distended              Incisions: clean, dry and intact  Skin: No rashes or lesions  Musculoskeletal: moves all four extremities equally  Psychological: alert and oriented, answers questions appropriately    Pertinent Labs  Lab Results: personally reviewed.  Lab Results   Component Value Date     02/24/2023    CO2 27 02/24/2023    BUN 4.6 02/24/2023     Lab Results   Component Value Date    WBC 7.7 02/24/2023    HGB 7.4 02/24/2023    HGB 8.4 02/23/2023    HGB 9.9 10/10/2013    HGB 10.4 10/09/2013    HCT 26.3 02/24/2023    MCV 85 02/24/2023     02/24/2023     10/11/2013     10/08/2013       Assessment/Plan: This is a 62 year old female POD #1 s/p lap right colectomy for colon cancer    - Continue clears for now. Potentially fulls later today if doing well  - Remove maldonado catheter  - AM labs  - Lovenox DVT ppx, will go home with extended course  - Monitor I&O  - OOB/Ambulate  - Encourage IS  - Await return of bowel funtion  - Pathology pending      Will discuss with Dr. Mcdermott  and addend with updates    Gabby Chadwick PA-C  [facility]  [phone]..............................main

## 2023-02-25 LAB
ANION GAP SERPL CALCULATED.3IONS-SCNC: 8 MMOL/L (ref 7–15)
BUN SERPL-MCNC: 4.1 MG/DL (ref 8–23)
CALCIUM SERPL-MCNC: 8.9 MG/DL (ref 8.8–10.2)
CHLORIDE SERPL-SCNC: 108 MMOL/L (ref 98–107)
CREAT SERPL-MCNC: 0.59 MG/DL (ref 0.51–0.95)
DEPRECATED HCO3 PLAS-SCNC: 26 MMOL/L (ref 22–29)
ERYTHROCYTE [DISTWIDTH] IN BLOOD BY AUTOMATED COUNT: 22 % (ref 10–15)
GFR SERPL CREATININE-BSD FRML MDRD: >90 ML/MIN/1.73M2
GLUCOSE BLDC GLUCOMTR-MCNC: 103 MG/DL (ref 70–99)
GLUCOSE SERPL-MCNC: 102 MG/DL (ref 70–99)
HCT VFR BLD AUTO: 27.8 % (ref 35–47)
HGB BLD-MCNC: 7.9 G/DL (ref 11.7–15.7)
MAGNESIUM SERPL-MCNC: 1.9 MG/DL (ref 1.7–2.3)
MCH RBC QN AUTO: 24.2 PG (ref 26.5–33)
MCHC RBC AUTO-ENTMCNC: 28.4 G/DL (ref 31.5–36.5)
MCV RBC AUTO: 85 FL (ref 78–100)
PLATELET # BLD AUTO: 416 10E3/UL (ref 150–450)
POTASSIUM SERPL-SCNC: 3.9 MMOL/L (ref 3.4–5.3)
RBC # BLD AUTO: 3.27 10E6/UL (ref 3.8–5.2)
SODIUM SERPL-SCNC: 142 MMOL/L (ref 136–145)
WBC # BLD AUTO: 7.5 10E3/UL (ref 4–11)

## 2023-02-25 PROCEDURE — 250N000013 HC RX MED GY IP 250 OP 250 PS 637: Performed by: STUDENT IN AN ORGANIZED HEALTH CARE EDUCATION/TRAINING PROGRAM

## 2023-02-25 PROCEDURE — 250N000013 HC RX MED GY IP 250 OP 250 PS 637: Performed by: COLON & RECTAL SURGERY

## 2023-02-25 PROCEDURE — 250N000011 HC RX IP 250 OP 636: Performed by: STUDENT IN AN ORGANIZED HEALTH CARE EDUCATION/TRAINING PROGRAM

## 2023-02-25 PROCEDURE — 83735 ASSAY OF MAGNESIUM: CPT | Performed by: COLON & RECTAL SURGERY

## 2023-02-25 PROCEDURE — 85027 COMPLETE CBC AUTOMATED: CPT | Performed by: STUDENT IN AN ORGANIZED HEALTH CARE EDUCATION/TRAINING PROGRAM

## 2023-02-25 PROCEDURE — 80048 BASIC METABOLIC PNL TOTAL CA: CPT | Performed by: STUDENT IN AN ORGANIZED HEALTH CARE EDUCATION/TRAINING PROGRAM

## 2023-02-25 PROCEDURE — 36415 COLL VENOUS BLD VENIPUNCTURE: CPT | Performed by: STUDENT IN AN ORGANIZED HEALTH CARE EDUCATION/TRAINING PROGRAM

## 2023-02-25 PROCEDURE — 120N000001 HC R&B MED SURG/OB

## 2023-02-25 RX ORDER — OXYCODONE HYDROCHLORIDE 5 MG/1
5 TABLET ORAL EVERY 4 HOURS PRN
Status: DISCONTINUED | OUTPATIENT
Start: 2023-02-25 | End: 2023-02-26 | Stop reason: HOSPADM

## 2023-02-25 RX ADMIN — GABAPENTIN 100 MG: 100 CAPSULE ORAL at 13:14

## 2023-02-25 RX ADMIN — OXYCODONE HYDROCHLORIDE 5 MG: 5 TABLET ORAL at 10:43

## 2023-02-25 RX ADMIN — ACETAMINOPHEN 975 MG: 325 TABLET ORAL at 03:37

## 2023-02-25 RX ADMIN — ENOXAPARIN SODIUM 40 MG: 40 INJECTION SUBCUTANEOUS at 13:14

## 2023-02-25 RX ADMIN — ACETAMINOPHEN 975 MG: 325 TABLET ORAL at 18:37

## 2023-02-25 RX ADMIN — GABAPENTIN 100 MG: 100 CAPSULE ORAL at 08:12

## 2023-02-25 RX ADMIN — OXYCODONE HYDROCHLORIDE 5 MG: 5 TABLET ORAL at 21:08

## 2023-02-25 RX ADMIN — HYDROMORPHONE HYDROCHLORIDE 0.4 MG: 1 INJECTION, SOLUTION INTRAMUSCULAR; INTRAVENOUS; SUBCUTANEOUS at 05:56

## 2023-02-25 RX ADMIN — Medication 10 MG: at 21:08

## 2023-02-25 RX ADMIN — OXYCODONE HYDROCHLORIDE 5 MG: 5 TABLET ORAL at 16:00

## 2023-02-25 RX ADMIN — HYDROMORPHONE HYDROCHLORIDE 0.4 MG: 1 INJECTION, SOLUTION INTRAMUSCULAR; INTRAVENOUS; SUBCUTANEOUS at 00:54

## 2023-02-25 RX ADMIN — ACETAMINOPHEN 975 MG: 325 TABLET ORAL at 10:43

## 2023-02-25 RX ADMIN — GABAPENTIN 100 MG: 100 CAPSULE ORAL at 21:08

## 2023-02-25 ASSESSMENT — ACTIVITIES OF DAILY LIVING (ADL)
ADLS_ACUITY_SCORE: 20

## 2023-02-25 NOTE — PLAN OF CARE
Goal Outcome Evaluation:      Plan of Care Reviewed With: patient    Overall Patient Progress: improving     Problem: Pain Acute  Goal: Optimal Pain Control and Function  Outcome: Progressing  Intervention: Develop Pain Management Plan     Problem: Infection  Goal: Absence of Infection Signs and Symptoms  Outcome: Progressing     Problem: Surgery Nonspecified  Goal: Effective Bowel Elimination  Outcome: Progressing     Patient up walking independently, walking in room and hallway. Continues to pass flatus and has active bowel sounds. Patient had very small bowel movement. Tolerating low fiber diet without any issues. Incision is open to air and is clean, dry, intact. Patient is voiding urine. 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 Scheduled tylenol and gabapentin along with PRN oxycodone given for pain and is effective in keeping pain tolerable.     Socorro Cole RN 2/25/2023 1:44 PM

## 2023-02-25 NOTE — PROGRESS NOTES
Colon and Rectal Surgery  Daily Progress Note    Subjective  Doing well. Pain controlled. Had some small stools yesterday and passing flatus this am. Denies n/v.     Objective  Intake/Output last 24 hrs:    Intake/Output Summary (Last 24 hours) at 2/24/2023 0925  Last data filed at 2/24/2023 0351  Gross per 24 hour   Intake 2539 ml   Output 3715 ml   Net -1176 ml     Temp:  [98.2  F (36.8  C)-98.6  F (37  C)] 98.6  F (37  C)  Pulse:  [83-94] 83  Resp:  [16-18] 18  BP: (131-148)/(62-71) 148/71  SpO2:  [92 %-97 %] 92 %  Excellent UOP    Physical Exam:  General: awake, alert, in no acute distress  Respiratory: non-labored breathing  Abdomen: soft, appropriately tender, non-distended              Incisions: clean, dry and intact      Pertinent Labs  Lab Results: personally reviewed.  CBC RESULTS: Recent Labs   Lab Test 02/25/23  0534   WBC 7.5   RBC 3.27*   HGB 7.9*   HCT 27.8*   MCV 85   MCH 24.2*   MCHC 28.4*   RDW 22.0*          Assessment/Plan: This is a 62 year old female POD #2 s/p lap right colectomy for colon cancer.  - LFD  - oral pain meds  - ambulation, lovenox  - hgb has been stable  - likely home tomorrow on extended lovenox    Taylor Pate MD  [facility]  Office: [phone]

## 2023-02-25 NOTE — PLAN OF CARE
"Goal Outcome Evaluation:      Plan of Care Reviewed With: patient, child    Overall Patient Progress: improvingOverall Patient Progress: improving    Outcome Evaluation: Pt tolerated a full liquid diet with no C/O nausea. She had been ambulating independently in her room and the hallway several times. She was also up in her recliner for dinner last night. She does C/O pain of the incisions rated 3-7/10 with PRN IV pain medication managing well. Voiding clear yellow urine in adequate amounts. Lap sites are healing nicely. Writer gave pt an abdominal binder for comfort while she is up and moving and she appreciated that. Will continue to monitor.    Potassium   Date Value Ref Range Status   02/24/2023 3.9 3.4 - 5.3 mmol/L Final     Magnesium   Date Value Ref Range Status   02/24/2023 2.2 1.7 - 2.3 mg/dL Final     BP (!) 148/71 (BP Location: Right arm)   Pulse 83   Temp 98.6  F (37  C) (Oral)   Resp 18   Ht 1.575 m (5' 2\")   Wt 73.4 kg (161 lb 14.4 oz)   SpO2 92%   BMI 29.61 kg/m      "

## 2023-02-25 NOTE — PLAN OF CARE
POD#1  IV dilaudid for pain 7/10 with relief  Up in chair and ambulating independently in halls with RN accompaniment.  Voiding + watery brown BM this shift x1.  Using IS.  Lap sites and incision c/d/I.  Recheck Mg and K in AM.  Continue to monitor.      Problem: Pain Acute  Goal: Optimal Pain Control and Function  Outcome: Progressing  Intervention: Develop Pain Management Plan  Recent Flowsheet Documentation  Taken 2/24/2023 1543 by Edda Hercules, RN  Pain Management Interventions: medication (see MAR)     Problem: Infection  Goal: Absence of Infection Signs and Symptoms  Outcome: Progressing     Problem: Surgery Nonspecified  Goal: Absence of Bleeding  Outcome: Progressing  Goal: Effective Bowel Elimination  Outcome: Progressing  Goal: Fluid and Electrolyte Balance  Outcome: Progressing  Goal: Blood Glucose Level Within Targeted Range  Outcome: Progressing  Goal: Absence of Infection Signs and Symptoms  Outcome: Progressing

## 2023-02-25 NOTE — PROGRESS NOTES
Care Management Follow Up    Length of Stay (days): 2    Expected Discharge Date: 02/26/2023     Concerns to be Addressed: no discharge needs identified     Patient plan of care discussed at interdisciplinary rounds: Yes    Anticipated Discharge Disposition: Home     Anticipated Discharge Services:    Anticipated Discharge DME:      Patient/family educated on Medicare website which has current facility and service quality ratings:    Education Provided on the Discharge Plan:    Patient/Family in Agreement with the Plan:      Referrals Placed by CM/SW:    Private pay costs discussed: Not applicable    Additional Information:  Chart reviewed.     Patient independent lives in house with significant other in [place], [place].     Plan per [facility] patient to likely discharge home tomorrow on extended lovenox.     No CM needs identified at this time.     CM will continue to follow care progression and aide in discharge planning as needed.     Ioana Garza RN

## 2023-02-26 VITALS
HEIGHT: 62 IN | RESPIRATION RATE: 16 BRPM | WEIGHT: 162.7 LBS | TEMPERATURE: 98.3 F | HEART RATE: 88 BPM | SYSTOLIC BLOOD PRESSURE: 126 MMHG | BODY MASS INDEX: 29.94 KG/M2 | OXYGEN SATURATION: 96 % | DIASTOLIC BLOOD PRESSURE: 58 MMHG

## 2023-02-26 LAB
ERYTHROCYTE [DISTWIDTH] IN BLOOD BY AUTOMATED COUNT: 21.5 % (ref 10–15)
HCT VFR BLD AUTO: 27.4 % (ref 35–47)
HGB BLD-MCNC: 7.7 G/DL (ref 11.7–15.7)
MAGNESIUM SERPL-MCNC: 1.7 MG/DL (ref 1.7–2.3)
MCH RBC QN AUTO: 23.9 PG (ref 26.5–33)
MCHC RBC AUTO-ENTMCNC: 28.1 G/DL (ref 31.5–36.5)
MCV RBC AUTO: 85 FL (ref 78–100)
PLATELET # BLD AUTO: 422 10E3/UL (ref 150–450)
POTASSIUM SERPL-SCNC: 3.5 MMOL/L (ref 3.4–5.3)
RBC # BLD AUTO: 3.22 10E6/UL (ref 3.8–5.2)
WBC # BLD AUTO: 7 10E3/UL (ref 4–11)

## 2023-02-26 PROCEDURE — 250N000013 HC RX MED GY IP 250 OP 250 PS 637: Performed by: STUDENT IN AN ORGANIZED HEALTH CARE EDUCATION/TRAINING PROGRAM

## 2023-02-26 PROCEDURE — 250N000013 HC RX MED GY IP 250 OP 250 PS 637: Performed by: COLON & RECTAL SURGERY

## 2023-02-26 PROCEDURE — 83735 ASSAY OF MAGNESIUM: CPT | Performed by: COLON & RECTAL SURGERY

## 2023-02-26 PROCEDURE — 84132 ASSAY OF SERUM POTASSIUM: CPT | Performed by: COLON & RECTAL SURGERY

## 2023-02-26 PROCEDURE — 36415 COLL VENOUS BLD VENIPUNCTURE: CPT | Performed by: COLON & RECTAL SURGERY

## 2023-02-26 PROCEDURE — 85014 HEMATOCRIT: CPT | Performed by: COLON & RECTAL SURGERY

## 2023-02-26 RX ORDER — ENOXAPARIN SODIUM 100 MG/ML
40 INJECTION SUBCUTANEOUS EVERY 24 HOURS
Qty: 10.4 ML | Refills: 0 | Status: SHIPPED | OUTPATIENT
Start: 2023-02-26 | End: 2023-03-24

## 2023-02-26 RX ORDER — OXYCODONE HYDROCHLORIDE 5 MG/1
5 TABLET ORAL EVERY 6 HOURS PRN
Qty: 12 TABLET | Refills: 0 | Status: SHIPPED | OUTPATIENT
Start: 2023-02-26 | End: 2023-03-01

## 2023-02-26 RX ADMIN — OXYCODONE HYDROCHLORIDE 5 MG: 5 TABLET ORAL at 11:02

## 2023-02-26 RX ADMIN — ACETAMINOPHEN 975 MG: 325 TABLET ORAL at 11:02

## 2023-02-26 RX ADMIN — GABAPENTIN 100 MG: 100 CAPSULE ORAL at 07:56

## 2023-02-26 RX ADMIN — OXYCODONE HYDROCHLORIDE 5 MG: 5 TABLET ORAL at 03:15

## 2023-02-26 RX ADMIN — ACETAMINOPHEN 975 MG: 325 TABLET ORAL at 03:14

## 2023-02-26 ASSESSMENT — ACTIVITIES OF DAILY LIVING (ADL)
ADLS_ACUITY_SCORE: 20

## 2023-02-26 NOTE — PLAN OF CARE
Goal Outcome Evaluation:      Plan of Care Reviewed With: patient    Overall Patient Progress: improving     Patient alert and oriented x4, pt up moving independently. Denies any nausea, tolerating low fiber diet. PRN oxycodone and scheduled tylenol given to help with pain on the drive home. Reviewed AVS with patient including medications, lovenox administration, follow up care, incision care, pain control, and when to call the provider. Addressed all questions. Patient feels ready to discharge and is stable for discharge. Daughter providing transportation to home.     Problem: Plan of Care - These are the overarching goals to be used throughout the patient stay.    Goal: Readiness for Transition of Care  Outcome: Met     Problem: Surgery Nonspecified  Goal: Optimal Pain Control and Function  Outcome: Adequate for Care Transition    Mary Ann Roper RN 2/26/2023 11:14 AM

## 2023-02-26 NOTE — DISCHARGE INSTRUCTIONS
You have just undergone abdominal surgery, a laparoscopic right colectomy for colon cancer. Here are a few things to expect after your surgery:    1) Pain is expected after surgery. It is important that your pain plan start with a combination of non-medication therapies and non-opioid medication therapies. Non-medication options include ice, heat, and light stretching. You can take Tylenol 1000mg every 6 hours and Ibuprofen 600mg every 6 hours with food, staggering the medications so you can take something for pain every 3 hours.  Do not take Ibuprofen if you have Crohn's disease, Ulcerative colitis, kidney disease or have had an adverse reaction in the past.  The opioid pain medication should be used if your pain is significant enough that you are not able to move as this is important for recovery.  In general, pain medication works better if you do not wait until you are in severe pain before using it, as it takes 30-60 minutes to take effect.  For your safety, take the lowest dose possible for the shortest amount of time.  For surgical patients with severe pain, addiction is rare when opioids are used for 5 days or less. We base the quantity of opioid medication based on standards established by Department of Health and American College [employer] Surgeons.  This can affect the number of opioid pills you are able to receive for post-surgical pain. If you do not feel that your pain is being adequately managed with your prescriptions, please call our office  2) Clear yellow or fruit-punch colored fluid can build up under your incision. Sometimes this oozes out, or sometimes a large amount will come from the wound in a short period of time, and then slow down. If the fluid is thick and foul-smelling, you have redness spreading out from your wound, fever greater than 101.5 F, or worsening pain you should call your provider.  3) It is fine to shower after your surgery unless your provider has instructed you otherwise. Do not  scrub the wounds. Do not apply an antibacterial ointment to the wound unless instructed otherwise. Baths or hot tubs should not be used until it is cleared by your provider at the first postoperative visit. If a portion of your wound is open, the dressing should be removed before showering. It is OK to get soap and water in the wound. After showering, pat dry and reapply the dressing.  4) Nausea can occur, and it is common to have less appetite or get full more quickly for several weeks after surgery. Frequent small meals work best. Try taking your pain medication with food to minimize any nausea from your pain medication. If you have vomiting or the nausea that is hard to control, call your provider.  5) It is normal for bowel movements to be irregular after surgery. However, at times diarrhea or constipation can be of concern. You may want to call you provider if you are having more than three diarrheal bowel movements for two days in a row, or have not had a bowel movement for three days.  6) It is normal for your temperature to be slightly higher than normal after surgery, or to feel cold or have hot flashes. If you have a multiple fevers over 101.5, please call your provider.  7) You should not lift more than 10 pounds (about a gallon of milk) for 6 weeks after surgery.  I would recommend you also not doing any strenuous/physical activities at least until your 1st postop appointment.  You should not drive while on narcotics.  8) You should resume all of your pre-surgery home medications as previously prescribed.  If any changes have been made to your medication regimen or to the doses of your medications, this will be noted on your hospital discharge paperwork.  If you have any questions about your pre-surgery medications or their doses, please contact your primary care provider.    9) Lastly, if you have any questions or concerns - PLEASE CALL ([phone])!  Our office has someone on call 24 hours a day.  If  your question is one that can wait until normal business hours (Mon-Fri 8:30AM-5PM), it is better to wait until the daytime as someone more familiar with your care will be able to help you.  However, if it is an emergency, the on-call surgeon will be able to give you good advice.

## 2023-02-26 NOTE — PROGRESS NOTES
Care Management Discharge Note    Discharge Date: 02/26/2023       Discharge Disposition: Home    Discharge Services:      Discharge DME:      Discharge Transportation: family or friend will provide    Private pay costs discussed: Not applicable    PAS Confirmation Code:    Patient/family educated on Medicare website which has current facility and service quality ratings:      Education Provided on the Discharge Plan:    Persons Notified of Discharge Plans: patient   Patient/Family in Agreement with the Plan:      Handoff Referral Completed: no     Additional Information:  Home with family, no care management needs identified         Taty Hawkins RN

## 2023-02-26 NOTE — PLAN OF CARE
POD#2  Ambulating halls independently  Up in chair  Using IS  Oxycodone and tylenol for pain  Lap sites c/d/I, abdominal binder on for comfort  Tolerating low fiber diet  Hopeful discharge for tomorrow  Continue to monitor    Problem: Pain Acute  Goal: Optimal Pain Control and Function  Outcome: Progressing  Intervention: Develop Pain Management Plan  Recent Flowsheet Documentation  Taken 2/25/2023 1600 by Edda Hercules RN  Pain Management Interventions: medication (see MAR)  Intervention: Prevent or Manage Pain  Recent Flowsheet Documentation  Taken 2/25/2023 1600 by Edda Hercules RN  Medication Review/Management: medications reviewed     Problem: Surgery Nonspecified  Goal: Effective Bowel Elimination  Outcome: Progressing  Goal: Fluid and Electrolyte Balance  Outcome: Progressing  Goal: Optimal Pain Control and Function  Outcome: Progressing  Intervention: Prevent or Manage Pain  Recent Flowsheet Documentation  Taken 2/25/2023 1600 by Edda Hercules RN  Pain Management Interventions: medication (see MAR)     Problem: Plan of Care - These are the overarching goals to be used throughout the patient stay.    Goal: Plan of Care Review  Description: The Plan of Care Review/Shift note should be completed every shift.  The Outcome Evaluation is a brief statement about your assessment that the patient is improving, declining, or no change.  This information will be displayed automatically on your shift note.  Outcome: Progressing

## 2023-02-26 NOTE — PLAN OF CARE
"Goal Outcome Evaluation:      Plan of Care Reviewed With: patient    Overall Patient Progress: improvingOverall Patient Progress: improving    Outcome Evaluation: Pt tolerated solids yesterday. Surgical pain rated 4-6/10 and well managed with prn oral medications. She ambulated frequently and independently last night. She is voiding adequate amounts and consuming adequate amounts of fluids. Her incisions are healing nicely. She is looking forward to going home later today.    /71 (BP Location: Right arm)   Pulse 97   Temp 97.9  F (36.6  C) (Oral)   Resp 16   Ht 1.575 m (5' 2\")   Wt 73.8 kg (162 lb 11.2 oz)   SpO2 98%   BMI 29.76 kg/m      "

## 2023-02-26 NOTE — DISCHARGE SUMMARY
Discharge Summary    Patient name: Chelle Cowan  YOB: 1960   Age: 62 year old  Medical Record Number: [id number]  Primary Care Physician:  Debbie Bowles       Admission Date: 2/23/2023.  Discharge Date: 2/26/23  Condition at Discharge: stable    Principal Diagnosis:  Colon cancer (H)    HISTORY OF PRESENT ILLNESS 62 year old female with ascending colon cancer    PROCEDURES PERFORMED DURING HOSPITALIZATION: laparoscopic right colectomy    FINAL PATHOLOGY: pending    BRIEF HOSPITAL COURSE: This 62 year old female presented for an elective laparoscopic right colectomy and was transferred to the surgical green following the procedure.  Diet was advanced with return of bowel function.  Pain medication was transitioned from IV to oral uneventfully.  At the time of discharge, she was voiding freely, tolerating a regular diet, and pain was controlled with oral pain medications.  She was discharged home on POD #3 in stable condition.     PHYSICAL EXAM ON DATE OF DISCHARGE: A&Ox3, NAD  abd soft, non distended, appropriately tender with incisions c/d/i  Respirations unlabored    Vital Signs in last 24 hours:   Temp:  [97.9  F (36.6  C)-98.4  F (36.9  C)] 98.3  F (36.8  C)  Pulse:  [] 88  Resp:  [16-18] 16  BP: (126-135)/(58-71) 126/58  SpO2:  [96 %-98 %] 96 %    COMPLICATIONS IN HOSPITAL: none    IMPORTANT PENDING TEST RESULTS: path pending    Discharge Medications/Orders:  lovenox    Total time spent for discharge on date of discharge: 20 minutes, with over half spent in counseling and coordinating care    I saw the patient on the date of discharge.    Taylor Pate MD  [facility]  [phone]    ADDENDUM:  Length of stay: 3 days  Indicate Y or N for the following:  UTI no  C diff no  PNA no  SSI no  DVT no  PE no  CVA no  MI no  Enterocutaneous fistula no  Peripheral nerve injury no  Abscess (not adjacent to anastomosis) no  Leak no                         Treated with:              Antibiotics no              Drain no              Reoperation no  Death within 30 days no  Reintubation no  Reoperation no              Procedure n/a    FOR CANCER CASES:  T stage (1,2,3,4,5 if unknown):5  N stage (0,1,2,3 if unknown): 3              Total number of nodes: pending              Total positive: pending  M stage (0,1): 0  R (0,1,2,3 if unknown):3

## 2023-02-28 LAB
LAB DIRECTOR COMMENTS: ABNORMAL
LAB DIRECTOR DISCLAIMER: ABNORMAL
LAB DIRECTOR INTERPRETATION: ABNORMAL
LAB DIRECTOR METHODOLOGY: ABNORMAL
LAB DIRECTOR RESULTS: ABNORMAL
SPECIMEN DESCRIPTION: ABNORMAL

## 2023-02-28 PROCEDURE — 81288 MLH1 GENE: CPT | Performed by: COLON & RECTAL SURGERY

## 2023-03-01 PROCEDURE — G0452 MOLECULAR PATHOLOGY INTERPR: HCPCS | Mod: 26 | Performed by: PATHOLOGY

## 2023-04-07 ENCOUNTER — TRANSFERRED RECORDS (OUTPATIENT)
Dept: HEALTH INFORMATION MANAGEMENT | Facility: CLINIC | Age: 63
End: 2023-04-07
Payer: COMMERCIAL

## 2023-04-13 PROCEDURE — 88363 XM ARCHIVE TISSUE MOLEC ANAL: CPT | Performed by: COLON & RECTAL SURGERY

## 2023-05-26 LAB
PATH REPORT.ADDENDUM SPEC: ABNORMAL
PATH REPORT.COMMENTS IMP SPEC: ABNORMAL
PATH REPORT.COMMENTS IMP SPEC: YES
PATH REPORT.FINAL DX SPEC: ABNORMAL
PATH REPORT.GROSS SPEC: ABNORMAL
PATH REPORT.MICROSCOPIC SPEC OTHER STN: ABNORMAL
PATH REPORT.RELEVANT HX SPEC: ABNORMAL
PATHOLOGY SYNOPTIC REPORT: ABNORMAL
PHOTO IMAGE: ABNORMAL

## 2023-08-11 ENCOUNTER — TRANSFERRED RECORDS (OUTPATIENT)
Dept: HEALTH INFORMATION MANAGEMENT | Facility: CLINIC | Age: 63
End: 2023-08-11
Payer: COMMERCIAL

## 2023-11-27 ENCOUNTER — TRANSFERRED RECORDS (OUTPATIENT)
Dept: HEALTH INFORMATION MANAGEMENT | Facility: CLINIC | Age: 63
End: 2023-11-27
Payer: COMMERCIAL

## 2024-02-04 ENCOUNTER — HEALTH MAINTENANCE LETTER (OUTPATIENT)
Age: 64
End: 2024-02-04

## 2024-02-06 ENCOUNTER — ANESTHESIA EVENT (OUTPATIENT)
Dept: SURGERY | Facility: AMBULATORY SURGERY CENTER | Age: 64
End: 2024-02-06

## 2024-02-07 ENCOUNTER — HOSPITAL ENCOUNTER (OUTPATIENT)
Facility: AMBULATORY SURGERY CENTER | Age: 64
Discharge: HOME | End: 2024-02-07
Attending: COLON & RECTAL SURGERY
Payer: COMMERCIAL

## 2024-02-07 ENCOUNTER — ANESTHESIA (OUTPATIENT)
Dept: SURGERY | Facility: AMBULATORY SURGERY CENTER | Age: 64
End: 2024-02-07

## 2024-02-07 VITALS
DIASTOLIC BLOOD PRESSURE: 83 MMHG | HEIGHT: 62 IN | OXYGEN SATURATION: 100 % | WEIGHT: 165 LBS | SYSTOLIC BLOOD PRESSURE: 144 MMHG | BODY MASS INDEX: 30.36 KG/M2 | HEART RATE: 83 BPM | RESPIRATION RATE: 16 BRPM | TEMPERATURE: 97.2 F

## 2024-02-07 DIAGNOSIS — Z85.038 PERSONAL HISTORY OF COLON CANCER: ICD-10-CM

## 2024-02-07 LAB — COLONOSCOPY: NORMAL

## 2024-02-07 RX ORDER — LIDOCAINE 40 MG/G
CREAM TOPICAL
Status: DISCONTINUED | OUTPATIENT
Start: 2024-02-07 | End: 2024-02-08 | Stop reason: HOSPADM

## 2024-02-07 RX ORDER — GLYCOPYRROLATE 0.2 MG/ML
INJECTION, SOLUTION INTRAMUSCULAR; INTRAVENOUS PRN
Status: DISCONTINUED | OUTPATIENT
Start: 2024-02-07 | End: 2024-02-07

## 2024-02-07 RX ORDER — PROPOFOL 10 MG/ML
INJECTION, EMULSION INTRAVENOUS PRN
Status: DISCONTINUED | OUTPATIENT
Start: 2024-02-07 | End: 2024-02-07

## 2024-02-07 RX ORDER — SODIUM CHLORIDE, SODIUM LACTATE, POTASSIUM CHLORIDE, CALCIUM CHLORIDE 600; 310; 30; 20 MG/100ML; MG/100ML; MG/100ML; MG/100ML
INJECTION, SOLUTION INTRAVENOUS CONTINUOUS
Status: DISCONTINUED | OUTPATIENT
Start: 2024-02-07 | End: 2024-02-08 | Stop reason: HOSPADM

## 2024-02-07 RX ORDER — PROPOFOL 10 MG/ML
INJECTION, EMULSION INTRAVENOUS CONTINUOUS PRN
Status: DISCONTINUED | OUTPATIENT
Start: 2024-02-07 | End: 2024-02-07

## 2024-02-07 RX ORDER — LIDOCAINE HYDROCHLORIDE 20 MG/ML
INJECTION, SOLUTION INFILTRATION; PERINEURAL PRN
Status: DISCONTINUED | OUTPATIENT
Start: 2024-02-07 | End: 2024-02-07

## 2024-02-07 RX ORDER — ONDANSETRON 4 MG/1
4 TABLET, ORALLY DISINTEGRATING ORAL
Status: DISCONTINUED | OUTPATIENT
Start: 2024-02-07 | End: 2024-02-08 | Stop reason: HOSPADM

## 2024-02-07 RX ORDER — ONDANSETRON 4 MG/1
4 TABLET, ORALLY DISINTEGRATING ORAL EVERY 30 MIN PRN
Status: DISCONTINUED | OUTPATIENT
Start: 2024-02-07 | End: 2024-02-08 | Stop reason: HOSPADM

## 2024-02-07 RX ORDER — ONDANSETRON 2 MG/ML
4 INJECTION INTRAMUSCULAR; INTRAVENOUS EVERY 30 MIN PRN
Status: DISCONTINUED | OUTPATIENT
Start: 2024-02-07 | End: 2024-02-08 | Stop reason: HOSPADM

## 2024-02-07 RX ADMIN — SODIUM CHLORIDE, SODIUM LACTATE, POTASSIUM CHLORIDE, CALCIUM CHLORIDE: 600; 310; 30; 20 INJECTION, SOLUTION INTRAVENOUS at 08:05

## 2024-02-07 RX ADMIN — PROPOFOL 10 MG: 10 INJECTION, EMULSION INTRAVENOUS at 08:54

## 2024-02-07 RX ADMIN — LIDOCAINE HYDROCHLORIDE 50 MG: 20 INJECTION, SOLUTION INFILTRATION; PERINEURAL at 08:49

## 2024-02-07 RX ADMIN — PROPOFOL 20 MG: 10 INJECTION, EMULSION INTRAVENOUS at 08:51

## 2024-02-07 RX ADMIN — PROPOFOL 200 MCG/KG/MIN: 10 INJECTION, EMULSION INTRAVENOUS at 08:51

## 2024-02-07 RX ADMIN — GLYCOPYRROLATE 0.2 MG: 0.2 INJECTION, SOLUTION INTRAMUSCULAR; INTRAVENOUS at 08:49

## 2024-02-07 NOTE — ANESTHESIA PREPROCEDURE EVALUATION
Anesthesia Pre-Procedure Evaluation    Patient: Chelle Cowan   MRN: [id number] : 1960        Procedure : Procedure(s):  COLONOSCOPY          Past Medical History:   Diagnosis Date    Anemia     Arthritis     Colon cancer (H)     History of blood transfusion     Obese     Thrombosis of leg     with third pregnancy    Varicella       Past Surgical History:   Procedure Laterality Date    ARTHROPLASTY HIP ANTERIOR  10/08/2013    Procedure: ARTHROPLASTY HIP ANTERIOR;  LEFT DIRECT ANTERIOR TOTAL HIP ARTHROPLASTY ;  Surgeon: Navneet Jefferson MD;  Location: [facility] OR    BREAST SURGERY  2011    right breast cyst    BREAST SURGERY      left breast bx    ENT SURGERY      wisdom teeth extraction    LAPAROSCOPIC ASSISTED COLECTOMY Right 2023    Procedure: LAPAROSCOPIC RIGHT COLECTOMY;  Surgeon: Zachery Mcdermott MD;  [facility]    WISDOM TOOTH EXTRACTION        No Known Allergies   Social History     Tobacco Use    Smoking status: Never    Smokeless tobacco: Never   Substance Use Topics    Alcohol use: Not Currently      Wt Readings from Last 1 Encounters:   24 74.8 kg (165 lb)        Anesthesia Evaluation   Pt has had prior anesthetic.     No history of anesthetic complications       ROS/MED HX  ENT/Pulmonary:  - neg pulmonary ROS     Neurologic:  - neg neurologic ROS     Cardiovascular:  - neg cardiovascular ROS     METS/Exercise Tolerance:     Hematologic:     (+) History of blood clots,               Musculoskeletal:   (+)  arthritis,             GI/Hepatic:  - neg GI/hepatic ROS     Renal/Genitourinary:  - neg Renal ROS     Endo:     (+)               Obesity,       Psychiatric/Substance Use:  - neg psychiatric ROS     Infectious Disease:  - neg infectious disease ROS     Malignancy:   (+) Malignancy, History of GI.GI CA  status post Surgery.      Other:  - neg other ROS          Physical Exam    Airway  airway exam normal      Mallampati: II   TM distance: > 3 FB   Neck ROM:  "full   Mouth opening: > 3 cm    Respiratory Devices and Support         Dental  no notable dental history     (+) Minor Abnormalities - some fillings, tiny chips      Cardiovascular   cardiovascular exam normal       Rhythm and rate: regular and normal     Pulmonary   pulmonary exam normal        breath sounds clear to auscultation           OUTSIDE LABS:  CBC:   Lab Results   Component Value Date    WBC 7.0 02/26/2023    WBC 7.5 02/25/2023    HGB 7.7 (L) 02/26/2023    HGB 7.9 (L) 02/25/2023    HCT 27.4 (L) 02/26/2023    HCT 27.8 (L) 02/25/2023     02/26/2023     02/25/2023     BMP:   Lab Results   Component Value Date     02/25/2023     02/24/2023    POTASSIUM 3.5 02/26/2023    POTASSIUM 3.9 02/25/2023    CHLORIDE 108 (H) 02/25/2023    CHLORIDE 105 02/24/2023    CO2 26 02/25/2023    CO2 27 02/24/2023    BUN 4.1 (L) 02/25/2023    BUN 4.6 (L) 02/24/2023    CR 0.59 02/25/2023    CR 0.60 02/24/2023     (H) 02/25/2023     (H) 02/25/2023     COAGS: No results found for: \"PTT\", \"INR\", \"FIBR\"  POC: No results found for: \"BGM\", \"HCG\", \"HCGS\"  HEPATIC: No results found for: \"ALBUMIN\", \"PROTTOTAL\", \"ALT\", \"AST\", \"GGT\", \"ALKPHOS\", \"BILITOTAL\", \"BILIDIRECT\", \"MINERVA\"  OTHER:   Lab Results   Component Value Date    NNAMDI 8.9 02/25/2023    MAG 1.7 02/26/2023       Anesthesia Plan    ASA Status:  2    NPO Status:  NPO Appropriate    Anesthesia Type: MAC.     - Reason for MAC: straight local not clinically adequate              Consents    Anesthesia Plan(s) and associated risks, benefits, and realistic alternatives discussed. Questions answered and patient/representative(s) expressed understanding.     - Discussed:     - Discussed with:  Patient            Postoperative Care    Pain management: IV analgesics, Oral pain medications, Multi-modal analgesia.   PONV prophylaxis: Ondansetron (or other 5HT-3), Dexamethasone or Solumedrol, Droperidol or Haldol     Comments:               Hector" "MD Kavita    I have reviewed the pertinent notes and labs in the chart from the past 30 days and (re)examined the patient.  Any updates or changes from those notes are reflected in this note.              # Obesity: Estimated body mass index is 30.18 kg/m  as calculated from the following:    Height as of this encounter: 1.575 m (5' 2\").    Weight as of this encounter: 74.8 kg (165 lb).      "

## 2024-02-07 NOTE — ANESTHESIA POSTPROCEDURE EVALUATION
Patient: Chelle Cowan    Procedure: Procedure(s):  COLONOSCOPY with snare polypectomy       Anesthesia Type:  MAC    Note:  Disposition: Outpatient   Postop Pain Control: Uneventful            Sign Out: Well controlled pain   PONV: No   Neuro/Psych: Uneventful            Sign Out: Acceptable/Baseline neuro status   Airway/Respiratory: Uneventful            Sign Out: Acceptable/Baseline resp. status   CV/Hemodynamics: Uneventful            Sign Out: Acceptable CV status; No obvious hypovolemia; No obvious fluid overload   Other NRE: NONE   DID A NON-ROUTINE EVENT OCCUR? No           Last vitals:  Vitals Value Taken Time   /83 02/07/24 1015   Temp 97.2  F (36.2  C) 02/07/24 1015   Pulse 81 02/07/24 1017   Resp 16 02/07/24 1015   SpO2 99 % 02/07/24 1017   Vitals shown include unfiled device data.    Electronically Signed By: Hector Walls MD  February 7, 2024  10:50 AM

## 2024-02-07 NOTE — H&P
[facility]   History and Physical       History of Present Illness: 63 year old female w/ hx of colon cancer s/p right colectomy 1 year ago.     Past Medical History:   Diagnosis Date    Anemia     Arthritis     Colon cancer (H)     History of blood transfusion     Obese     Thrombosis of leg     with third pregnancy    Varicella        No Known Allergies    Past Surgical History:   Procedure Laterality Date    ARTHROPLASTY HIP ANTERIOR  10/08/2013    Procedure: ARTHROPLASTY HIP ANTERIOR;  LEFT DIRECT ANTERIOR TOTAL HIP ARTHROPLASTY ;  Surgeon: Navneet Jefferson MD;  Location: [facility] OR    BREAST SURGERY  01/01/2011    right breast cyst    BREAST SURGERY      left breast bx    ENT SURGERY      wisdom teeth extraction    LAPAROSCOPIC ASSISTED COLECTOMY Right 2/23/2023    Procedure: LAPAROSCOPIC RIGHT COLECTOMY;  Surgeon: Zachery Mcdermott MD;  Location: [facility] OR    WISDOM TOOTH EXTRACTION         History reviewed. No pertinent family history.    Social History     Socioeconomic History    Marital status: Single     Spouse name: Not on file    Number of children: Not on file    Years of education: Not on file    Highest education level: Not on file   Occupational History    Not on file   Tobacco Use    Smoking status: Never    Smokeless tobacco: Never   Substance and Sexual Activity    Alcohol use: Not Currently    Drug use: No    Sexual activity: Not on file   Other Topics Concern    Not on file   Social History Narrative    Not on file     Social Determinants of Health     Financial Resource Strain: Not on file   Food Insecurity: Not on file   Transportation Needs: Not on file   Physical Activity: Not on file   Stress: Not on file   Social Connections: Not on file   Interpersonal Safety: Not on file   Housing Stability: Not on file       Current Outpatient Medications   Medication    docusate sodium (COLACE) 100 MG tablet    ferrous sulfate (FEROSUL) 325 (65 Fe) MG tablet    Glucosamine  HCl 1000 MG TABS    ibuprofen (ADVIL/MOTRIN) 200 MG tablet    MELATONIN PO    Multiple Vitamin (THERAGRAN OR)    VITAMIN D, CHOLECALCIFEROL, PO     Current Facility-Administered Medications   Medication    lactated ringers infusion    lidocaine (LMX4) kit    lidocaine 1 % 0.1-1 mL    sodium chloride (PF) 0.9% PF flush 3 mL    sodium chloride (PF) 0.9% PF flush 3 mL       Review of Systems:   A full 10 point review of systems was taken and is negative aside from what is noted above in the HPI.    Consitutional / General: Denies wt changes, fevers, chills or sweats.  Skin: Denies rashes, bruising, pruritis, or bleeding tendencies.   ENT: Denies trauma, headache, hearing loss, tinnitus, dysphagia  Eyes: Denies double vision  Respiratory: Denies SOB, cough, sputum production or dyspnea on exertion.   Cardiovascular: Denies chest pain, palpitations, orthostatic hypotension  Hematology / Lymph: Denies hx of blood clots or pulmonary embolism  Gastrointestinal:  Denies loss of appetite, dysphagia, N/V, constipation or diarrhea.   Genitourinary: Denies dysuria, frequency, urgency, hesitancy, incontinence, nocturia, hematuria, difficulty starting or stopping their stream, hx of stones or hx of uti's.   Neurologic: Denies headache, LOC, memory loss, vertigo, syncope or seizures.   Musculoskeletal: Denies back pain, numbness, tingling or hx of back surgery  Psychological: alert and oriented    Intake/Output past 24 hrs:  No intake or output data in the 24 hours ending 02/07/24 0844    Physical Exam:  Temp:  [98.2  F (36.8  C)] 98.2  F (36.8  C)  Pulse:  [86] 86  Resp:  [16] 16  BP: (148)/(75) 148/75  SpO2:  [97 %] 97 %  General: NAD, alert, cooperative  Head: normocephalic, without abnormality / atraumatic  Respiratory: non-labored breathing, CTA-B  Cardiac: RRR +S1/S2  Abdomen: soft, non-tender, non-distended.  No guarding or rebound   Perianal/Rectal: deferred   Skin: no rashes or lesions  Musculoskeletal: moves all four  extremities equally; no calf edema or tenderness  Psychological: alert and oriented, answers questions appropriately  Neurological: cranial nerves grossly intact    CBC RESULTS:   Recent Labs   Lab Test 02/26/23  0542   WBC 7.0   RBC 3.22*   HGB 7.7*   HCT 27.4*   MCV 85   MCH 23.9*   MCHC 28.1*   RDW 21.5*          Last Comprehensive Metabolic Panel:  Sodium   Date Value Ref Range Status   02/25/2023 142 136 - 145 mmol/L Final     Potassium   Date Value Ref Range Status   02/26/2023 3.5 3.4 - 5.3 mmol/L Final     Chloride   Date Value Ref Range Status   02/25/2023 108 (H) 98 - 107 mmol/L Final     Carbon Dioxide (CO2)   Date Value Ref Range Status   02/25/2023 26 22 - 29 mmol/L Final     Anion Gap   Date Value Ref Range Status   02/25/2023 8 7 - 15 mmol/L Final     Glucose   Date Value Ref Range Status   02/25/2023 102 (H) 70 - 99 mg/dL Final   10/10/2013 98 60 - 99 mg/dL Final     GLUCOSE BY METER POCT   Date Value Ref Range Status   02/25/2023 103 (H) 70 - 99 mg/dL Final     Urea Nitrogen   Date Value Ref Range Status   02/25/2023 4.1 (L) 8.0 - 23.0 mg/dL Final     Creatinine   Date Value Ref Range Status   02/25/2023 0.59 0.51 - 0.95 mg/dL Final   10/08/2013 0.45 (L) 0.52 - 1.04 mg/dL Final     GFR Estimate   Date Value Ref Range Status   02/25/2023 >90 >60 mL/min/1.73m2 Final     Comment:     eGFR calculated using 2021 CKD-EPI equation.   10/08/2013 >90 >60 mL/min/1.7m2 Final     Calcium   Date Value Ref Range Status   02/25/2023 8.9 8.8 - 10.2 mg/dL Final       Assessment: Chelle Cowan is a 63 year old female presenting for surveillance colonoscopy.     Plan: Colonoscopy under LORE Mcdermott MD, KEVIN  [facility]  Office: [phone]  2/7/2024 8:44 AM

## 2024-02-07 NOTE — DISCHARGE INSTRUCTIONS
Post-Colonoscopy Discharge Instructions:    1. You have just had an examination of your colon (large intestine) called a colonoscopy. You should have a full report of the findings to take with you today. It will list if any polyps were removed or if any biopsies were taken to send to the laboratory. If we did take out polyps or do biopsies, you should get a letter in the next 1-2 weeks if they are the standard pre-cancerous polyps. If it is anything more serious, your doctor will call you. The timing of your next colonoscopy is determined by your family history of polyps/colon cancer and what findings were on your colonoscopy. [facility] will keep track of when you are due for your next colonoscopy and contact you.     2. No driving or operating heavy power equipment today.    3. Do not drink alcohol for 12 hours after the procedure.     4. Do not sign any important or legally binding papers today.    5. No strenuous activity today (its a great day for a nap and to lay on the couch and watch some TV!).     6. You can eat whatever you like after your procedure.     7. You may experience drowsiness or forgetfulness at first. You may also notice altered  bowel habits, excessive gas and belching or bloated feeling.    8. If recommended by your physician, you should avoid Aspirin, Aspirin products and   Arthritis medications for 7 - 10 days following the exam if biopsies or polypectomies   have been performed.    9. You may develop soreness or redness where your IV medication was given.    Apply warm wet cloths to the area for 15 minutes 3 - 4 times per day.    10. You may have a bloated, gaseous feeling in your abdomen after a  Colonoscopy for the next few hours. Passing gas and belching will help. Walking or lying down on your left side with your knees flexed may relieve the discomfort.    11. Call the office at [phone] right away if you notice any of the following:  a. Vomiting of blood  and/or  coffee ground  material.  b. Rectal bleeding - >1Tbsp, blood clots, or continuous bleeding.  c. Severe abdominal pain.  d. Chills, fever over 101 degrees F.  e. Persistent nausea or vomiting.  f. Persistent disorientation/confusion.  g. Persistent coughing or spitting up blood.  h. Persistent redness, hardness, or tenderness at IV site.

## 2024-03-12 ENCOUNTER — TRANSFERRED RECORDS (OUTPATIENT)
Dept: HEALTH INFORMATION MANAGEMENT | Facility: CLINIC | Age: 64
End: 2024-03-12
Payer: COMMERCIAL

## 2024-04-15 ENCOUNTER — TRANSFERRED RECORDS (OUTPATIENT)
Dept: HEALTH INFORMATION MANAGEMENT | Facility: CLINIC | Age: 64
End: 2024-04-15
Payer: COMMERCIAL

## 2024-07-23 ENCOUNTER — TRANSFERRED RECORDS (OUTPATIENT)
Dept: HEALTH INFORMATION MANAGEMENT | Facility: CLINIC | Age: 64
End: 2024-07-23
Payer: COMMERCIAL

## 2024-12-12 ENCOUNTER — TRANSFERRED RECORDS (OUTPATIENT)
Dept: HEALTH INFORMATION MANAGEMENT | Facility: CLINIC | Age: 64
End: 2024-12-12
Payer: COMMERCIAL

## 2025-03-02 ENCOUNTER — HEALTH MAINTENANCE LETTER (OUTPATIENT)
Age: 65
End: 2025-03-02

## 2025-06-04 ENCOUNTER — TRANSCRIBE ORDERS (OUTPATIENT)
Dept: OTHER | Age: 65
End: 2025-06-04

## 2025-06-04 DIAGNOSIS — K82.4 GALLBLADDER POLYP: Primary | ICD-10-CM

## 2025-06-11 ENCOUNTER — OFFICE VISIT (OUTPATIENT)
Dept: SURGERY | Facility: CLINIC | Age: 65
End: 2025-06-11
Payer: COMMERCIAL

## 2025-06-11 VITALS — WEIGHT: 170.5 LBS | BODY MASS INDEX: 31.38 KG/M2 | HEIGHT: 62 IN

## 2025-06-11 DIAGNOSIS — K82.4 GALLBLADDER POLYP: ICD-10-CM

## 2025-06-11 PROCEDURE — 99203 OFFICE O/P NEW LOW 30 MIN: CPT | Performed by: SURGERY

## 2025-06-11 NOTE — LETTER
6/11/2025      Chelle Cowan  [address] [place] [address]      Dear Colleague,    Thank you for referring your patient, Chelle Cowan, to the [facility] [place]. Please see a copy of my visit note below.    HPI:  Chelle Cowan is a 65 year old female who was referred to me by Dr. Silvino Chauhan for evaluation of gallbladder polyps.  Patient has a history of colon cancer status post right hemicolectomy 2 years ago.  In the course of her follow-up, gallbladder polyps were discovered on an ultrasound done last year in April.  A repeat ultrasound this year again showed gallbladder polyps.  Unfortunately I am not able to see either the report or the images from these 2 ultrasounds.  Per the patient's recollection there was not a significant change except for maybe some wall thickening.  Patient denies any abdominal pain or any symptoms consistent with biliary colic.    Allergies:Patient has no known allergies.    Past Medical History:   Diagnosis Date     Anemia      Arthritis      Colon cancer (H)      History of blood transfusion      Obese      Thrombosis of leg     with third pregnancy     Varicella        Past Surgical History:   Procedure Laterality Date     ARTHROPLASTY HIP ANTERIOR  10/08/2013    Procedure: ARTHROPLASTY HIP ANTERIOR;  LEFT DIRECT ANTERIOR TOTAL HIP ARTHROPLASTY ;  Surgeon: Navneet Jefferson MD;  Location:  OR     BREAST SURGERY  01/01/2011    right breast cyst     BREAST SURGERY      left breast bx     COLONOSCOPY N/A 2/7/2024    Procedure: COLONOSCOPY with snare polypectomy;  Surgeon: Zachery Mcdermott MD;  Location: [facility] OR     ENT SURGERY      wisdom teeth extraction     LAPAROSCOPIC ASSISTED COLECTOMY Right 2/23/2023    Procedure: LAPAROSCOPIC RIGHT COLECTOMY;  Surgeon: Zachery Mcdermott MD;  Location: [facility] OR     WISDOM TOOTH EXTRACTION         Current Outpatient Medications   Medication Sig Dispense  "Refill     docusate sodium (COLACE) 100 MG tablet Take 200 mg by mouth At Bedtime       ferrous sulfate (FEROSUL) 325 (65 Fe) MG tablet Take 325 mg by mouth 2 times daily On hold       Glucosamine HCl 1000 MG TABS Take 1,000 mg by mouth daily       ibuprofen (ADVIL/MOTRIN) 200 MG tablet Take 400 mg by mouth every 6 hours as needed for pain       MELATONIN PO Take 10 mg by mouth At Bedtime       Multiple Vitamin (THERAGRAN OR) Take 1 tablet by mouth daily       VITAMIN D, CHOLECALCIFEROL, PO Take 2,000 Units by mouth daily         No family history on file.     reports that she has never smoked. She has never used smokeless tobacco. She reports that she does not currently use alcohol. She reports that she does not use drugs.      Ht 1.575 m (5' 2\")   Wt 77.3 kg (170 lb 8 oz)   BMI 31.18 kg/m    Body mass index is 31.18 kg/m .    EXAM:  GENERAL: Well developed female in NAD  HEENT: Pupils are round and reactive, sclera are anicteric.   NECK:  No obvious masses or deformities  CV: RRR  PULM: Non-labored breathing on RA  NEURO: No obvious deficits noted.  ABD: Soft and nondistended  EXT: No edema, no obvious deformities or any other abnormalities    LABS:   WBC Count   Date Value Ref Range Status   02/26/2023 7.0 4.0 - 11.0 10e3/uL Final        IMAGES:   Unable to see the reports or images of ultrasounds    Assessment/Plan:    Chelle Cowan is a 65 year old female presenting with gallbladder polyps.  Unfortunately without the images is hard for me to make a definitive decision.  We did talk about gallbladder polyps in general and for asymptomatic polyps surgery decision is based off of size criteria.  If larger than 1 cm we will recommend surgery.  If between 5 and 10 mm we could continue surveillance.  A smaller than 5 mm she does not need further follow-up.  We briefly discussed laparoscopic cholecystectomy and the expected postoperative course.  Patient is amenable to what ever treatment is necessary.  We " will work on getting the imaging to make appropriate decision.         Karson Mitchell MD  General Surgeon  [address]  [place] [address]?  Office: [phone]      Again, thank you for allowing me to participate in the care of your patient.        Sincerely,        Karson Mitchell MD    Electronically signed

## 2025-06-11 NOTE — PROGRESS NOTES
HPI:  Chelle Cowan is a 65 year old female who was referred to me by Dr. Silvino Chauhan for evaluation of gallbladder polyps.  Patient has a history of colon cancer status post right hemicolectomy 2 years ago.  In the course of her follow-up, gallbladder polyps were discovered on an ultrasound done last year in April.  A repeat ultrasound this year again showed gallbladder polyps.  Unfortunately I am not able to see either the report or the images from these 2 ultrasounds.  Per the patient's recollection there was not a significant change except for maybe some wall thickening.  Patient denies any abdominal pain or any symptoms consistent with biliary colic.    Allergies:Patient has no known allergies.    Past Medical History:   Diagnosis Date    Anemia     Arthritis     Colon cancer (H)     History of blood transfusion     Obese     Thrombosis of leg     with third pregnancy    Varicella        Past Surgical History:   Procedure Laterality Date    ARTHROPLASTY HIP ANTERIOR  10/08/2013    Procedure: ARTHROPLASTY HIP ANTERIOR;  LEFT DIRECT ANTERIOR TOTAL HIP ARTHROPLASTY ;  Surgeon: Navneet Jefferson MD;  Location:  OR    BREAST SURGERY  01/01/2011    right breast cyst    BREAST SURGERY      left breast bx    COLONOSCOPY N/A 2/7/2024    Procedure: COLONOSCOPY with snare polypectomy;  Surgeon: Zachery Mcdermott MD;  Location: [facility] OR    ENT SURGERY      wisdom teeth extraction    LAPAROSCOPIC ASSISTED COLECTOMY Right 2/23/2023    Procedure: LAPAROSCOPIC RIGHT COLECTOMY;  Surgeon: Zachery Mcdermott MD;  Location: [facility] OR    WISDOM TOOTH EXTRACTION         Current Outpatient Medications   Medication Sig Dispense Refill    docusate sodium (COLACE) 100 MG tablet Take 200 mg by mouth At Bedtime      ferrous sulfate (FEROSUL) 325 (65 Fe) MG tablet Take 325 mg by mouth 2 times daily On hold      Glucosamine HCl 1000 MG TABS Take 1,000 mg by mouth daily      ibuprofen (ADVIL/MOTRIN) 200 MG tablet Take 400  "mg by mouth every 6 hours as needed for pain      MELATONIN PO Take 10 mg by mouth At Bedtime      Multiple Vitamin (THERAGRAN OR) Take 1 tablet by mouth daily      VITAMIN D, CHOLECALCIFEROL, PO Take 2,000 Units by mouth daily         No family history on file.     reports that she has never smoked. She has never used smokeless tobacco. She reports that she does not currently use alcohol. She reports that she does not use drugs.      Ht 1.575 m (5' 2\")   Wt 77.3 kg (170 lb 8 oz)   BMI 31.18 kg/m    Body mass index is 31.18 kg/m .    EXAM:  GENERAL: Well developed female in NAD  HEENT: Pupils are round and reactive, sclera are anicteric.   NECK:  No obvious masses or deformities  CV: RRR  PULM: Non-labored breathing on RA  NEURO: No obvious deficits noted.  ABD: Soft and nondistended  EXT: No edema, no obvious deformities or any other abnormalities    LABS:   WBC Count   Date Value Ref Range Status   02/26/2023 7.0 4.0 - 11.0 10e3/uL Final        IMAGES:   Unable to see the reports or images of ultrasounds    Assessment/Plan:    Chelle Cowan is a 65 year old female presenting with gallbladder polyps.  Unfortunately without the images is hard for me to make a definitive decision.  We did talk about gallbladder polyps in general and for asymptomatic polyps surgery decision is based off of size criteria.  If larger than 1 cm we will recommend surgery.  If between 5 and 10 mm we could continue surveillance.  A smaller than 5 mm she does not need further follow-up.  We briefly discussed laparoscopic cholecystectomy and the expected postoperative course.  Patient is amenable to what ever treatment is necessary.  We will work on getting the imaging to make appropriate decision.         Karson Mitchell MD  General Surgeon  [facility]  Surgery [address]  [address]  [place] [address]?  Office: [phone]    "

## 2025-07-01 ENCOUNTER — TRANSFERRED RECORDS (OUTPATIENT)
Dept: HEALTH INFORMATION MANAGEMENT | Facility: CLINIC | Age: 65
End: 2025-07-01
Payer: COMMERCIAL

## 2025-07-01 ENCOUNTER — TELEPHONE (OUTPATIENT)
Dept: SURGERY | Facility: CLINIC | Age: 65
End: 2025-07-01
Payer: COMMERCIAL

## 2025-07-01 NOTE — TELEPHONE ENCOUNTER
Patient had an appointment with Dr Mitchell on 6/11/2025 and said that he was supposed to get back to her after reviewing her imaging. She has not heard back yet. She can be reached at [phone].

## 2025-07-09 NOTE — TELEPHONE ENCOUNTER
Patient called this afternoon.  I was able to see her ultrasound.  The largest polyp that I found was about 8-9 mm.  The reports of wall thickening are not concerning to me at this time.  I think it is reasonable to do a follow up US in 6 months time to monitor for change.  If that is stable, will repeat annually for a few years.  If enlarging or symptoms start, will plan for cholecystectomy.  Patient comfortable with this plan.     Karson Mitchell MD  General Surgeon  [facility]  Surgery [facility] - [address] [address]  Office: [phone]

## (undated) DEVICE — SUCTION MANIFOLD NEPTUNE 2 SYS 1 PORT 702-025-000

## (undated) DEVICE — SU VICRYL+ 0 27 UR6 VLT VCP603H

## (undated) DEVICE — LUBRICANT SURG 2 OZ STRL FLIP TOP 2OZLUB

## (undated) DEVICE — MAT FLOOR SURGICAL 40X38 0702140238

## (undated) DEVICE — LEGGINGS 31X48" LF KM89408

## (undated) DEVICE — BLADE KNIFE SURG 10 371110

## (undated) DEVICE — SUTURE ENDO SURGITIE 21 POLYSORB EL23LN

## (undated) DEVICE — SU VICRYL+ 0 TIES 18IN UND VCP112G

## (undated) DEVICE — GLOVE BIOGEL INDICATOR 7.5 LF 41675

## (undated) DEVICE — ESU ELEC LEEP EXTENDER DLP-X10

## (undated) DEVICE — ESU LIGASURE LAPAROSCOPIC BLUNT TIP SEALER 5MMX37CM LF1837

## (undated) DEVICE — TUBING SUCTION MEDI-VAC 1/4"X20' N620A - HE

## (undated) DEVICE — SUCTION TIP YANKAUER W/O VENT K86

## (undated) DEVICE — PAD POS XL 1X20X40IN PINK PIGAZZI

## (undated) DEVICE — ESU PENCIL SMOKE EVAC W/ROCKER SWITCH 0703-047-000

## (undated) DEVICE — DRAPE POUCH INSTRUMENT 3 POCKET 1018L

## (undated) DEVICE — SU PDS II 3-0 SH 27" Z316H

## (undated) DEVICE — CUSTOM PACK COLON CLOSING SBA5BCCHEA

## (undated) DEVICE — PLATE GROUNDING ADULT W/CORD 9165L

## (undated) DEVICE — SUTURE PDS 1 CTB-1 ZB347

## (undated) DEVICE — SU MONOCRYL+ 4-0 18IN PS2 UND MCP496G

## (undated) DEVICE — SU VICRYL+ 2-0 TIES 18 UND VCP111G

## (undated) DEVICE — SUTURE VICRYL 0 SH UNDYED J418H

## (undated) DEVICE — SU VICRYL+ 3-0 27IN SH UND VCP416H

## (undated) DEVICE — STPL LINEAR 90 X 3.5MM TA9035S

## (undated) DEVICE — ENDO TROCAR FIRST ENTRY KII FIOS Z-THRD 05X100MM CTF03

## (undated) DEVICE — ENDO SHEARS RENEW LAP ENDOCUT SCISSOR TIP 16.5MM 3142

## (undated) DEVICE — GOWN XLG DISP 9545

## (undated) DEVICE — BLADE KNIFE SURG 11 371111

## (undated) DEVICE — ENDO TROCAR BLUNT TIP KII BALLOON 12X100MM C0R47

## (undated) DEVICE — Device

## (undated) DEVICE — SOL WATER IRRIG 1000ML BOTTLE 2F7114

## (undated) DEVICE — ADH SKIN CLOSURE PREMIERPRO EXOFIN 1.0ML 3470

## (undated) DEVICE — GRADUATE 32OZ/1000CC CLR DISP 33303

## (undated) DEVICE — DRAPE IOBAN INCISE 23X17" 6650EZ

## (undated) DEVICE — DRAPE LAP/CHOLE W/O POUCH 89225

## (undated) DEVICE — SYSTEM LAPAROVUE VISIBILITY LAPVUE10

## (undated) DEVICE — GLOVE UNDER INDICATOR PI SZ 7.0 LF 41670

## (undated) DEVICE — ADH LIQUID MASTISOL TOPICAL VIAL 2-3ML 0523-48

## (undated) DEVICE — SUTURE VICRYL+ 3-0 18 SH/CR UND VCP864

## (undated) DEVICE — CUSTOM PACK LAP CHOLE SBA5BLCHEA

## (undated) DEVICE — TUBING SMOKE EVAC PNEUMOCLEAR HIGH FLOW 0620050250

## (undated) DEVICE — PREP CHLORAPREP 26ML TINTED HI-LITE ORANGE 930815

## (undated) DEVICE — CAUTERY BLADE NEEDLE 1IN COATED E1452

## (undated) DEVICE — PROTECTOR ARM STANDARD ONE STEP

## (undated) DEVICE — CATH TRAY FOLEY SURESTEP 16FR DRAIN BAG STATOCK A899916

## (undated) DEVICE — STPL LINEAR CUT 75MM TLC75

## (undated) DEVICE — ENDO TROCAR SLEEVE KII Z-THREADED 05X100MM CTS02

## (undated) RX ORDER — ONDANSETRON 2 MG/ML
INJECTION INTRAMUSCULAR; INTRAVENOUS
Status: DISPENSED
Start: 2023-02-23

## (undated) RX ORDER — FENTANYL CITRATE-0.9 % NACL/PF 10 MCG/ML
PLASTIC BAG, INJECTION (ML) INTRAVENOUS
Status: DISPENSED
Start: 2023-02-23

## (undated) RX ORDER — FENTANYL CITRATE 50 UG/ML
INJECTION, SOLUTION INTRAMUSCULAR; INTRAVENOUS
Status: DISPENSED
Start: 2023-02-23

## (undated) RX ORDER — BUPIVACAINE HYDROCHLORIDE 2.5 MG/ML
INJECTION, SOLUTION EPIDURAL; INFILTRATION; INTRACAUDAL
Status: DISPENSED
Start: 2023-02-23

## (undated) RX ORDER — KETAMINE HYDROCHLORIDE 10 MG/ML
INJECTION INTRAMUSCULAR; INTRAVENOUS
Status: DISPENSED
Start: 2023-02-23

## (undated) RX ORDER — PROPOFOL 10 MG/ML
INJECTION, EMULSION INTRAVENOUS
Status: DISPENSED
Start: 2023-02-23